# Patient Record
Sex: FEMALE | Race: WHITE
[De-identification: names, ages, dates, MRNs, and addresses within clinical notes are randomized per-mention and may not be internally consistent; named-entity substitution may affect disease eponyms.]

---

## 2018-03-04 ENCOUNTER — HOSPITAL ENCOUNTER (INPATIENT)
Dept: HOSPITAL 17 - PHED | Age: 83
LOS: 5 days | Discharge: HOME HEALTH | DRG: 541 | End: 2018-03-09
Attending: INTERNAL MEDICINE | Admitting: INTERNAL MEDICINE
Payer: MEDICARE

## 2018-03-04 VITALS
RESPIRATION RATE: 20 BRPM | TEMPERATURE: 98 F | SYSTOLIC BLOOD PRESSURE: 120 MMHG | HEART RATE: 65 BPM | DIASTOLIC BLOOD PRESSURE: 58 MMHG | OXYGEN SATURATION: 92 %

## 2018-03-04 VITALS
HEART RATE: 71 BPM | RESPIRATION RATE: 18 BRPM | OXYGEN SATURATION: 93 % | DIASTOLIC BLOOD PRESSURE: 70 MMHG | TEMPERATURE: 98.2 F | SYSTOLIC BLOOD PRESSURE: 156 MMHG

## 2018-03-04 VITALS
OXYGEN SATURATION: 97 % | SYSTOLIC BLOOD PRESSURE: 137 MMHG | DIASTOLIC BLOOD PRESSURE: 59 MMHG | RESPIRATION RATE: 16 BRPM | HEART RATE: 82 BPM

## 2018-03-04 VITALS
RESPIRATION RATE: 16 BRPM | OXYGEN SATURATION: 92 % | HEART RATE: 60 BPM | TEMPERATURE: 97.6 F | DIASTOLIC BLOOD PRESSURE: 48 MMHG | SYSTOLIC BLOOD PRESSURE: 100 MMHG

## 2018-03-04 VITALS — HEIGHT: 58 IN | BODY MASS INDEX: 30.4 KG/M2 | WEIGHT: 144.84 LBS

## 2018-03-04 DIAGNOSIS — E78.5: ICD-10-CM

## 2018-03-04 DIAGNOSIS — I10: ICD-10-CM

## 2018-03-04 DIAGNOSIS — M19.90: ICD-10-CM

## 2018-03-04 DIAGNOSIS — M70.21: ICD-10-CM

## 2018-03-04 DIAGNOSIS — Z87.01: ICD-10-CM

## 2018-03-04 DIAGNOSIS — M86.9: Primary | ICD-10-CM

## 2018-03-04 DIAGNOSIS — Z95.810: ICD-10-CM

## 2018-03-04 DIAGNOSIS — J44.9: ICD-10-CM

## 2018-03-04 DIAGNOSIS — Z79.82: ICD-10-CM

## 2018-03-04 DIAGNOSIS — I25.10: ICD-10-CM

## 2018-03-04 DIAGNOSIS — M54.31: ICD-10-CM

## 2018-03-04 DIAGNOSIS — Z95.5: ICD-10-CM

## 2018-03-04 DIAGNOSIS — Z87.891: ICD-10-CM

## 2018-03-04 DIAGNOSIS — I25.2: ICD-10-CM

## 2018-03-04 LAB
BASOPHILS # BLD AUTO: 0.3 TH/MM3 (ref 0–0.2)
BASOPHILS NFR BLD: 2.9 % (ref 0–2)
BUN SERPL-MCNC: 27 MG/DL (ref 7–18)
CALCIUM SERPL-MCNC: 9 MG/DL (ref 8.5–10.1)
CHLORIDE SERPL-SCNC: 102 MEQ/L (ref 98–107)
CREAT SERPL-MCNC: 1.2 MG/DL (ref 0.5–1)
EOSINOPHIL # BLD: 0.3 TH/MM3 (ref 0–0.4)
EOSINOPHIL NFR BLD: 2.2 % (ref 0–4)
ERYTHROCYTE [DISTWIDTH] IN BLOOD BY AUTOMATED COUNT: 14.4 % (ref 11.6–17.2)
GFR SERPLBLD BASED ON 1.73 SQ M-ARVRAT: 42 ML/MIN (ref 89–?)
GLUCOSE SERPL-MCNC: 82 MG/DL (ref 74–106)
HCO3 BLD-SCNC: 27.7 MEQ/L (ref 21–32)
HCT VFR BLD CALC: 36.5 % (ref 35–46)
HGB BLD-MCNC: 12.2 GM/DL (ref 11.6–15.3)
LYMPHOCYTES # BLD AUTO: 1.3 TH/MM3 (ref 1–4.8)
LYMPHOCYTES NFR BLD AUTO: 11.6 % (ref 9–44)
MCH RBC QN AUTO: 29.7 PG (ref 27–34)
MCHC RBC AUTO-ENTMCNC: 33.3 % (ref 32–36)
MCV RBC AUTO: 89 FL (ref 80–100)
MONOCYTE #: 1 TH/MM3 (ref 0–0.9)
MONOCYTES NFR BLD: 8.6 % (ref 0–8)
NEUTROPHILS # BLD AUTO: 8.6 TH/MM3 (ref 1.8–7.7)
NEUTROPHILS NFR BLD AUTO: 74.7 % (ref 16–70)
PLATELET # BLD: 203 TH/MM3 (ref 150–450)
PMV BLD AUTO: 7.6 FL (ref 7–11)
RBC # BLD AUTO: 4.11 MIL/MM3 (ref 4–5.3)
SODIUM SERPL-SCNC: 136 MEQ/L (ref 136–145)
WBC # BLD AUTO: 11.5 TH/MM3 (ref 4–11)

## 2018-03-04 PROCEDURE — 82565 ASSAY OF CREATININE: CPT

## 2018-03-04 PROCEDURE — 94150 VITAL CAPACITY TEST: CPT

## 2018-03-04 PROCEDURE — 96374 THER/PROPH/DIAG INJ IV PUSH: CPT

## 2018-03-04 PROCEDURE — 85025 COMPLETE CBC W/AUTO DIFF WBC: CPT

## 2018-03-04 PROCEDURE — 73200 CT UPPER EXTREMITY W/O DYE: CPT

## 2018-03-04 PROCEDURE — 87040 BLOOD CULTURE FOR BACTERIA: CPT

## 2018-03-04 PROCEDURE — 86140 C-REACTIVE PROTEIN: CPT

## 2018-03-04 PROCEDURE — 72100 X-RAY EXAM L-S SPINE 2/3 VWS: CPT

## 2018-03-04 PROCEDURE — 93971 EXTREMITY STUDY: CPT

## 2018-03-04 PROCEDURE — 80048 BASIC METABOLIC PNL TOTAL CA: CPT

## 2018-03-04 PROCEDURE — 84550 ASSAY OF BLOOD/URIC ACID: CPT

## 2018-03-04 PROCEDURE — 85652 RBC SED RATE AUTOMATED: CPT

## 2018-03-04 RX ADMIN — CARVEDILOL SCH MG: 12.5 TABLET, FILM COATED ORAL at 20:04

## 2018-03-04 RX ADMIN — Medication SCH ML: at 20:03

## 2018-03-04 RX ADMIN — STANDARDIZED SENNA CONCENTRATE AND DOCUSATE SODIUM SCH TAB: 8.6; 5 TABLET, FILM COATED ORAL at 21:16

## 2018-03-04 RX ADMIN — ENOXAPARIN SODIUM SCH MG: 30 INJECTION SUBCUTANEOUS at 20:04

## 2018-03-04 NOTE — PD
HPI


Chief Complaint:  Musculoskeletal Complaint


Time Seen by Provider:  14:02


Travel History


International Travel<30 days:  No


Contact w/Intl Traveler<30days:  No


Traveled to known affect area:  No





History of Present Illness


HPI


Patient is an 87-year-old female presents emergency department for evaluation 

of right arm pain which is been going on for the past few weeks.  Patient 

states been gradually worsening and enough was enough and she decided to come 

the emergency department to be seen.  She has been seen by her [facility] doctor who said that she had "an infection in her elbow" and she was 

had finished some antibiotics but still was not feeling any better and thought 

it might of been tendinitis, she was referred to a sports medicine doctor who 

sent her for some imaging and to "rule out osteomyelitis".  She had imaging 

done this week and does not know the results.  She states that she had some 

kind of contrast for a bone scan.  She denies any fevers denies any nausea 

vomiting denies any injury but states the swelling has progressed from just in 

her elbow now on her hand as well.  She has been taking hydrocodone at home 

with some relief but she states it makes her very sleepy.





PFSH


Past Medical History


Hx Anticoagulant Therapy:  Yes (asa 81mg)


Arthritis:  Yes (Fingers)


Asthma:  No


Autoimmune Disease:  No


Blood Disorders:  No


Anxiety:  Yes


Depression:  No


Heart Rhythm Problems:  No


Cancer:  No


Cardiac Catheterization:  Yes (MI, angioplasty, stents)


Cardiovascular Problems:  Yes (htn on meds, pacemaker, defib)


High Cholesterol:  Yes


Chemotherapy:  No


Chest Pain:  Yes


Congestive Heart Failure:  Yes


COPD:  Yes


Cerebrovascular Accident:  No


Coronary Artery Disease:  Yes


Diabetes:  No


Patient Takes Glucophage:  No


Diminished Hearing:  No


Endocrine:  No


Gastrointestinal Disorders:  Yes (HX c-diff, peritonitis)


GERD:  Yes


Genitourinary:  Yes (Frequency)


Headaches:  Yes


Hepatitis:  No


Hiatal Hernia:  Yes


Hypertension:  Yes


Immune Disorder:  No


Kidney Stones:  No


Medical other:  Yes (SOME TYPE OF CONNECTIVE TISSUE DISORDER)


Musculoskeletal:  Yes (Tendonitis (R) shoulder, DDD low back)


Neurologic:  No


Psychiatric:  Yes


Reproductive:  No


Respiratory:  Yes (copd)


Integumentary:  Yes (CELLULITIS LEFT FOOT/CONNECTIVE TISSUE DISORDER)


Immunizations Current:  Yes


Migraines:  No


Myocardial Infarction:  Yes


Pneumonia:  Yes (H/O)


Radiation Therapy:  No


Renal Failure:  No


Seizures:  Yes


Shingles:  Yes


Sleep Apnea:  No


Thyroid Disease:  No


Ulcer:  No


PNEUMOCCOCAL Vaccine (Year):  1


Pregnant?:  Not Pregnant


Menopausal:  Yes





Past Surgical History


Abdominal Surgery:  Yes ( REMOVED CYST IN ABDOMEN,APPENDECTOMY)


AICD:  Yes


Appendectomy:  Yes


Arteriovenous Shunt:  Yes


Body Medical Devices:  Cardiac stents


Cardiac Surgery:  Yes (PM and DF)


Coronary Stent:  Yes


Ear Surgery:  No


Endocrine Surgery:  No


Eye Surgery:  Yes (BL cataracts)


Genitourinary Surgery:  No


Gynecologic Surgery:  No


Insulin Pump:  No


Joint Replacement:  No


Neurologic Surgery:  No


Oral Surgery:  No


Pacemaker:  Yes


Thoracic Surgery:  No


Other Surgery:  Yes (Multiple for SBO, eye implants)





Family History


Family Hypercholesterolemia:  Yes





Social History


Alcohol Use:  Yes (Occ.)


Tobacco Use:  No (Quit)


Substance Use:  No





Allergies-Medications


(Allergen,Severity, Reaction):  


Coded Allergies:  


     Sulfa (Sulfonamide Antibiotics) (Unverified  Allergy, Severe, Rash, 3/4/18)


     cilostazol (Unverified  Allergy, Severe, Itching, 3/4/18)


     niacin (Unverified  Allergy, Severe, Rash, 3/4/18)


     sulfisoxazole (Unverified  Allergy, Severe, Rash, 3/4/18)


     *MDRO Multi-Drug Resistant Organism (Verified  Allergy, Unknown, 3/4/18)


 C-diff 2/2014


Reported Meds & Prescriptions





Reported Meds & Active Scripts


Active


Reported


Ventolin Hfa 18 GM Inh (Albuterol Sulfate) 90 Mcg/Act Aer 2 Puff INH Q6H PRN


Tramadol (Tramadol HCl) 50 Mg Tab 50 Mg PO HS PRN


Temazepam 15 Mg Cap 15 Mg PO HS PRN


[Promethazinie-Codei]     


Multiple Vitamin 1 Tab 1 Tab PO DAILY


Lovastatin 40 Mg Tab 80 Mg PO DAILY


Losartan (Losartan Potassium) 50 Mg Tab 50 Mg PO BID


[Ipratropium Bromide]     


Hydrochlorothiazide 12.5 Mg Cap 12.5 Mg PO DAILY


Carvedilol 12.5 Mg Tab 12.5 Mg PO BID


Aspirin Children's (Aspirin) 81 Mg Chew 81 Mg CHEW DAILY


Alprazolam 0.5 Mg Tab 0.5 Mg PO BID PRN


[Albuterol Nebs]     


Hydrocodone-Acetaminophen 5-325 mg Tab 1 Tab PO Q6H PRN








Review of Systems


Except as stated in HPI:  all other systems reviewed are Neg





Physical Exam


Narrative


GENERAL:Well-developed, well-nourished, appears uncomfortable almost hysterical.


SKIN: Focused skin assessment warm/dry.


HEAD: Atraumatic. Normocephalic. 


EYES: Pupils equal and round. No scleral icterus. No injection or drainage. 


ENT: No nasal bleeding or discharge.  Mucous membranes pink and moist.


NECK: Trachea midline. No JVD. 


CARDIOVASCULAR: Regular rate and rhythm.  No murmur appreciated.


RESPIRATORY: No accessory muscle use. Clear to auscultation. Breath sounds 

equal bilaterally. 


GASTROINTESTINAL: Abdomen soft, non-tender, nondistended. Hepatic and splenic 

margins not palpable. 


MUSCULOSKELETAL: No obvious deformities. No clubbing.  No cyanosis.  There is 

some moderate edema pitting over the MCP joints on the right hand, there is 

also some edema particularly on the medial aspect of her right elbow.  I do not 

appreciate a joint effusion at the elbow, she has some tenderness over the 

olecranon process.  The joint is not hot, there is no erythema, she is able to 

range the joint and flexion extension supination and pronation.


NEUROLOGICAL: Awake and alert. No obvious cranial nerve deficits.  Motor 

grossly within normal limits. Normal speech.


PSYCHIATRIC: Appropriate mood and affect; insight and judgment normal.





Data


Data


Last Documented VS





Vital Signs








  Date Time  Temp Pulse Resp B/P (MAP) Pulse Ox O2 Delivery O2 Flow Rate FiO2


 


3/4/18 14:04   16     


 


3/4/18 12:52 98.2 71  156/70 (98) 93   








Orders





 Orders


Complete Blood Count With Diff (3/4/18 14:14)


Basic Metabolic Panel (Bmp) (3/4/18 14:14)


C-Reactive Protein (Crp) (3/4/18 14:27)


Westergren Sedimentation Rate (3/4/18 14:27)


Morphine Inj (Morphine Inj) (3/4/18 14:30)


Us Arm Venous Doppler (3/4/18 14:27)


Blood Culture (3/4/18 15:55)


Vancomycin Inj (Vancomycin Inj) (3/4/18 16:00)


Admit Order (Ed Use Only) (3/4/18 )





Labs





Laboratory Tests








Test


  3/4/18


14:20 3/4/18


14:52


 


White Blood Count 11.5 TH/MM3  


 


Red Blood Count 4.11 MIL/MM3  


 


Hemoglobin 12.2 GM/DL  


 


Hematocrit 36.5 %  


 


Mean Corpuscular Volume 89.0 FL  


 


Mean Corpuscular Hemoglobin 29.7 PG  


 


Mean Corpuscular Hemoglobin


Concent 33.3 % 


  


 


 


Red Cell Distribution Width 14.4 %  


 


Platelet Count 203 TH/MM3  


 


Mean Platelet Volume 7.6 FL  


 


Neutrophils (%) (Auto) 74.7 %  


 


Lymphocytes (%) (Auto) 11.6 %  


 


Monocytes (%) (Auto) 8.6 %  


 


Eosinophils (%) (Auto) 2.2 %  


 


Basophils (%) (Auto) 2.9 %  


 


Neutrophils # (Auto) 8.6 TH/MM3  


 


Lymphocytes # (Auto) 1.3 TH/MM3  


 


Monocytes # (Auto) 1.0 TH/MM3  


 


Eosinophils # (Auto) 0.3 TH/MM3  


 


Basophils # (Auto) 0.3 TH/MM3  


 


CBC Comment DIFF FINAL  


 


Differential Comment   


 


Blood Urea Nitrogen 27 MG/DL  


 


Creatinine 1.20 MG/DL  


 


Random Glucose 82 MG/DL  


 


Calcium Level 9.0 MG/DL  


 


Sodium Level 136 MEQ/L  


 


Potassium Level 4.2 MEQ/L  


 


Chloride Level 102 MEQ/L  


 


Carbon Dioxide Level 27.7 MEQ/L  


 


Anion Gap 6 MEQ/L  


 


Estimat Glomerular Filtration


Rate 42 ML/MIN 


  


 


 


Erythrocyte Sedimentation Rate  92 mm/hr 


 


C-Reactive Protein  9.97 MG/DL 











MDM


Medical Decision Making


Medical Screen Exam Complete:  Yes


Emergency Medical Condition:  Yes


Differential Diagnosis


Osteomyelitis, infectious intra-articular arthritis unlikely, cellulitis 

unlikely, osteoarthritis


Narrative Course


Patient's nuclear medicine bone scan was pulled out from [employer], 

dated 3/2/2018.  Unfortunately her printer is not working so I am directly 

translating it here:





<direct quote>


MEDICAL/SURGICAL HISTORY:  pain in right elbow with no known trauma since 118.  

Patient was diagnosed with an infection in the right elbow and has had multiple 

rounds of antibiotics ever since.


COMPARED: No prior exams available for comparison


PRIOR BONE SCANS: No correlated bone scans available for comparison.


TECHNIQUE: Three-phase bone scanning of bilateral elbows was performed.


FINDINGS: There is increased blood pool activity involving the patient's right 

forearm and on delayed imaging there is a focal uptake appears to be 

corresponding to the bony structures at the site particularly the distal 

humerus.  Exact localization is difficult.


CONCLUSION:


Abnormal uptake partially within the bony structures of the elbow difficult to 

accurately localize suspicious for osteomyelitis and further characterization 

with MRI examination with and without contrast is recommended.


Electronically signed by: NORTH Liu MD


<end direct quote>





Patient unfortunately cannot have MRI she states she has a pacemaker which she 

does not think is MRI compatible.





Patient's ESR and CRP are elevated, white blood cell count is normal but there 

is some minimal left shift.  The patient was discussed with Dr. Rosado, we agree 

that admission is for the workup and diagnosis is warranted at this time.  

Patient blood cultures were drawn and sent and will be started on vancomycin.  

Patient does have significant history of C. difficile colitis which had to be 

treated with multiple rounds of antimicrobials in the past.





Results were discussed with the patient and she is agreeable for admission, her 

pain is well under control after morphine given.





Given her swelling and ultrasound of her right upper extremity was also 

obtained which shows no evidence of DVT:





Last 24 hours Impressions








Upper Extremity Ultrasound 3/4/18 3527 Signed





Impressions: 





 Service Date/Time:  Sunday, March 4, 2018 15:03 - CONCLUSION:  Normal 





 examination.       Octavio David MD 











Diagnosis





 Primary Impression:  


 Osteomyelitis of elbow





Admitting Information


Admitting Physician Requests:  Admit


Condition:  Stable











Jose Mata MD Mar 4, 2018 14:47

## 2018-03-04 NOTE — RADRPT
EXAM DATE/TIME:  03/04/2018 15:03 

 

[facility] COMPARISON:     

No previous studies available for comparison.

        

 

 

INDICATIONS :                

Right arm swelling. 

            

 

MEDICAL HISTORY :     

Hypertension. Myocardial infarction. Hypercholesterolemia. Dentures. Congestive heart failure. Corona
ry artery disease. Anticoagulant therapy. Chest pain. COPD. Emphysema. Hiatal hernia. Urinary tract i
nfection. Arthritis. Anxiety. Alcohol use. Shingles. Cellulitis. 

 

SURGICAL HISTORY :     

Pacemaker. Appendectomy.   Bilateral cataract surgery. AV fistula. Cardiac catheterization. Angioplas
ty. Removed cyst from abdomen. 

 

ENCOUNTER:     

Initial

 

ACUITY:     

1 week

 

PAIN SCORE:      

5/10

 

LOCATION:      

Right  arm.

                       

 

FINDINGS:     

There is spontaneous flow documented in the brachial, basilic, cephalic, axillary, and subclavian vei
ns.  The vessels are compressible and augmentation response is documented.  No filling defects are se
en.  The flow is phasic with respiration.  Direction of flow in the jugular vein is caudal.  

 

CONCLUSION:     

Normal examination.  

 

 

 

 Octavio David MD on March 04, 2018 at 15:32           

Board Certified Radiologist.

 This report was verified electronically.

## 2018-03-05 VITALS
SYSTOLIC BLOOD PRESSURE: 93 MMHG | RESPIRATION RATE: 16 BRPM | TEMPERATURE: 96.2 F | DIASTOLIC BLOOD PRESSURE: 51 MMHG | HEART RATE: 79 BPM | OXYGEN SATURATION: 92 %

## 2018-03-05 VITALS
RESPIRATION RATE: 18 BRPM | HEART RATE: 56 BPM | DIASTOLIC BLOOD PRESSURE: 60 MMHG | SYSTOLIC BLOOD PRESSURE: 131 MMHG | TEMPERATURE: 96.3 F | OXYGEN SATURATION: 93 %

## 2018-03-05 VITALS
SYSTOLIC BLOOD PRESSURE: 112 MMHG | OXYGEN SATURATION: 94 % | RESPIRATION RATE: 15 BRPM | DIASTOLIC BLOOD PRESSURE: 56 MMHG | HEART RATE: 62 BPM

## 2018-03-05 VITALS
RESPIRATION RATE: 18 BRPM | OXYGEN SATURATION: 92 % | HEART RATE: 68 BPM | DIASTOLIC BLOOD PRESSURE: 57 MMHG | TEMPERATURE: 96.5 F | SYSTOLIC BLOOD PRESSURE: 114 MMHG

## 2018-03-05 VITALS
HEART RATE: 63 BPM | DIASTOLIC BLOOD PRESSURE: 47 MMHG | SYSTOLIC BLOOD PRESSURE: 93 MMHG | TEMPERATURE: 97.6 F | OXYGEN SATURATION: 92 % | RESPIRATION RATE: 18 BRPM

## 2018-03-05 LAB
BASOPHILS # BLD AUTO: 0.1 TH/MM3 (ref 0–0.2)
BASOPHILS NFR BLD: 0.8 % (ref 0–2)
BUN SERPL-MCNC: 25 MG/DL (ref 7–18)
CALCIUM SERPL-MCNC: 8.7 MG/DL (ref 8.5–10.1)
CHLORIDE SERPL-SCNC: 105 MEQ/L (ref 98–107)
CREAT SERPL-MCNC: 1.1 MG/DL (ref 0.5–1)
EOSINOPHIL # BLD: 0.3 TH/MM3 (ref 0–0.4)
EOSINOPHIL NFR BLD: 2.5 % (ref 0–4)
ERYTHROCYTE [DISTWIDTH] IN BLOOD BY AUTOMATED COUNT: 14.4 % (ref 11.6–17.2)
GFR SERPLBLD BASED ON 1.73 SQ M-ARVRAT: 47 ML/MIN (ref 89–?)
GLUCOSE SERPL-MCNC: 77 MG/DL (ref 74–106)
HCO3 BLD-SCNC: 26 MEQ/L (ref 21–32)
HCT VFR BLD CALC: 33.8 % (ref 35–46)
HGB BLD-MCNC: 11.5 GM/DL (ref 11.6–15.3)
LYMPHOCYTES # BLD AUTO: 0.8 TH/MM3 (ref 1–4.8)
LYMPHOCYTES NFR BLD AUTO: 8.3 % (ref 9–44)
MCH RBC QN AUTO: 30.4 PG (ref 27–34)
MCHC RBC AUTO-ENTMCNC: 34 % (ref 32–36)
MCV RBC AUTO: 89.3 FL (ref 80–100)
MONOCYTE #: 0.6 TH/MM3 (ref 0–0.9)
MONOCYTES NFR BLD: 6 % (ref 0–8)
NEUTROPHILS # BLD AUTO: 8.3 TH/MM3 (ref 1.8–7.7)
NEUTROPHILS NFR BLD AUTO: 82.4 % (ref 16–70)
PLATELET # BLD: 249 TH/MM3 (ref 150–450)
PMV BLD AUTO: 7.4 FL (ref 7–11)
RBC # BLD AUTO: 3.78 MIL/MM3 (ref 4–5.3)
SODIUM SERPL-SCNC: 138 MEQ/L (ref 136–145)
WBC # BLD AUTO: 10.1 TH/MM3 (ref 4–11)

## 2018-03-05 RX ADMIN — STANDARDIZED SENNA CONCENTRATE AND DOCUSATE SODIUM SCH TAB: 8.6; 5 TABLET, FILM COATED ORAL at 08:22

## 2018-03-05 RX ADMIN — LOSARTAN POTASSIUM SCH MG: 50 TABLET, FILM COATED ORAL at 08:22

## 2018-03-05 RX ADMIN — HYDROCODONE BITARTRATE AND ACETAMINOPHEN PRN TAB: 5; 325 TABLET ORAL at 06:20

## 2018-03-05 RX ADMIN — Medication SCH ML: at 09:00

## 2018-03-05 RX ADMIN — CARVEDILOL SCH MG: 12.5 TABLET, FILM COATED ORAL at 08:22

## 2018-03-05 RX ADMIN — Medication SCH ML: at 20:15

## 2018-03-05 RX ADMIN — ASPIRIN 81 MG SCH MG: 81 TABLET ORAL at 08:22

## 2018-03-05 RX ADMIN — ENOXAPARIN SODIUM SCH MG: 30 INJECTION SUBCUTANEOUS at 20:14

## 2018-03-05 RX ADMIN — VANCOMYCIN HYDROCHLORIDE SCH MLS/HR: 1 INJECTION, SOLUTION INTRAVENOUS at 17:15

## 2018-03-05 RX ADMIN — STANDARDIZED SENNA CONCENTRATE AND DOCUSATE SODIUM SCH TAB: 8.6; 5 TABLET, FILM COATED ORAL at 20:14

## 2018-03-05 RX ADMIN — HYDROCODONE BITARTRATE AND ACETAMINOPHEN PRN TAB: 5; 325 TABLET ORAL at 20:15

## 2018-03-05 RX ADMIN — CARVEDILOL SCH MG: 12.5 TABLET, FILM COATED ORAL at 20:15

## 2018-03-05 RX ADMIN — HYDROCODONE BITARTRATE AND ACETAMINOPHEN PRN TAB: 5; 325 TABLET ORAL at 00:41

## 2018-03-05 RX ADMIN — HYDROCHLOROTHIAZIDE SCH MG: 12.5 CAPSULE ORAL at 08:21

## 2018-03-05 NOTE — PD.ID.CON
History of Present Illness


Service


ID


Consult Requested By


Dr Richy Rosado


Reason for Consult


osteo L elbow


Primary Care Physician


Sedrick Das MD


Diagnoses:  


History of Present Illness


86 yo female with multiple medical problems 


she developped R elbow pain since January


Has a brief course of Keflex without improvement


She presented with edematous tender R elbow





H/o refractory C.diff that required almost 6 mos of treatment





Review of Systems


Except as stated in HPI:  all other systems reviewed are Neg





Past Family Social History


Allergies:  


Coded Allergies:  


     Sulfa (Sulfonamide Antibiotics) (Unverified  Allergy, Severe, Rash, 3/4/18)


     cilostazol (Unverified  Allergy, Severe, Itching, 3/4/18)


     niacin (Unverified  Allergy, Severe, Rash, 3/4/18)


     sulfisoxazole (Unverified  Allergy, Severe, Rash, 3/4/18)


Past Medical History


  coronary artery disease with stents


 status post pacemaker and ICD.  


 COPD.


 CHF, hypertension, hyperlipidemia.  


 C. difficile colitis that lasted for several months


Past Surgical History


   cataracts surgery


 appendectomy. 


kyphoplasty for compression fracture.


multiple surgeries for small bowel obstruction.


Active Ordered Medications


Medications where reviewed in EMR


Antibiotics Include:  none 


Vancomycin was given while n the hospital


Family History


reviewed


nbon contributory to current ID issue


Social History


  alcohol  occasionally. former  smoker many years, quite


 independent in her activities of daily living.





Physical Exam


Vital Signs





Vital Signs








  Date Time  Temp Pulse Resp B/P (MAP) Pulse Ox O2 Delivery O2 Flow Rate FiO2


 


3/5/18 16:00 96.5 68 18 114/57 (76) 92   


 


3/5/18 12:00  62 15 112/56 (74) 94   


 


3/5/18 08:00 96.3 56 18 131/60 (83) 93   


 


3/5/18 07:20   20     


 


3/5/18 00:00 97.6 63 18 93/47 (62) 92   








Physical Exam


CONSTITUTIONAL/GENERAL: This is an obese elderly  nourished patient, in no 

apparent distress.


TUBES/LINES/DRAINS:


SKIN: No jaundice, rashes, or lesions.   Skin temperature appropriate. Not 

diaphoretic. 


HEAD: Atraumatic. Normocephalic.


EYES: Pupils equal and round and reactive. Extraocular motions intact. No 

scleral icterus. No injection or drainage. Fundi not examined.


ENT: Hearing grossly normal. Nose without bleeding or purulent drainage. Throat 

without visible erythema, exudates, masses, or lesions.


NECK: Trachea midline. Supple, nontender. No palpable thyroid enlargement or 

nodularity. 


CARDIOVASCULAR: Regular rate and rhythm without murmurs, gallops, or rubs. No 

JVD. Peripheral pulses symmetric.


RESPIRATORY/CHEST: Symmetric, unlabored respirations. Clear to auscultation. 

Breath sounds equal bilaterally. No wheezes, rales, or rhonchi.  


GASTROINTESTINAL: Abdomen soft, non-tender, nondistended. No hepato-splenomegaly

, or palpable masses. No guarding. Bowel sounds present.


GENITOURINARY: Without palpable bladder distension.  


MUSCULOSKELETAL: Extremities without clubbing, cyanosis, or edema. No joint 

tenderness or effusion noted. No calf tenderness. No mottling or clubbing.


STATUS LOCALIS: ill defined edema, boggyness of R elbow. Unable to fully extend 

2/2 pain


Tender to palpation


No erythema


LYMPHATICS: No palpable cervical axillae or supraclavicular adenopathy.


NEUROLOGICAL: Awake and alert. Motor and sensory grossly within normal limits. 

Follows commands. Clear speech Moves all extremities.


PSYCHIATRIC: No obvious anxiety/depression. no apparent hallucinations or other 

psychotic thought process.


Laboratory





Laboratory Tests








Test


  3/4/18


22:25 3/5/18


05:30 3/5/18


09:00


 


Uric Acid 8.3   


 


Blood Urea Nitrogen  25  


 


Creatinine  1.10  


 


Random Glucose  77  


 


Calcium Level  8.7  


 


Sodium Level  138  


 


Potassium Level  3.8  


 


Chloride Level  105  


 


Carbon Dioxide Level  26.0  


 


Estimat Glomerular Filtration


Rate 


  47 


  


 


 


White Blood Count   10.1 


 


Red Blood Count   3.78 


 


Hemoglobin   11.5 


 


Hematocrit   33.8 


 


Mean Corpuscular Volume   89.3 


 


Mean Corpuscular Hemoglobin   30.4 


 


Mean Corpuscular Hemoglobin


Concent 


  


  34.0 


 


 


Red Cell Distribution Width   14.4 


 


Platelet Count   249 


 


Mean Platelet Volume   7.4 


 


Neutrophils (%) (Auto)   82.4 


 


Lymphocytes (%) (Auto)   8.3 


 


Monocytes (%) (Auto)   6.0 


 


Eosinophils (%) (Auto)   2.5 


 


Basophils (%) (Auto)   0.8 


 


Neutrophils # (Auto)   8.3 


 


Lymphocytes # (Auto)   0.8 


 


Monocytes # (Auto)   0.6 


 


Eosinophils # (Auto)   0.3 


 


Basophils # (Auto)   0.1 


 


CBC Comment   DIFF FINAL 


 


Differential Comment    














 Date/Time


Source Procedure


Growth Status


 


 


 3/4/18 14:38


Blood Peripheral Aerobic Blood Culture - Preliminary


NO GROWTH IN 1 DAY Resulted


 


 3/4/18 14:38


Blood Peripheral Anaerobic Blood Culture - Preliminary


NO GROWTH IN 1 DAY Resulted








Result Diagram:  


3/5/18 0900                                                                    

            3/5/18 0530





Imaging





Last Impressions








Upper Extremity Ultrasound 3/4/18 1427 Signed





Impressions: 





 Service Date/Time:  Sunday, March 4, 2018 15:03 - CONCLUSION:  Normal 





 examination.       Octavio David MD 


 


Upper Extremity CT 3/4/18 0000 Signed





Impressions: 





 Service Date/Time:  Monday, March 5, 2018 01:01 - CONCLUSION:  1. There is an 





 abnormal fluid collection along with soft tissue swelling and subcutaneous 

edema 





 on the posterior aspect of the elbow. 2. No osseous changes are identified to 





 definitively indicate osteomyelitis. However, the prior bone scan performed at 





 [facility] on 3/2/2018 demonstrated increased blood flow and 





 possible increased uptake in the distal humerus suspicious for osteomyelitis. 





 Therefore, consider correlating with either MRI with and without intravenous 





 contrast or tagged white blood cell study to better evaluate. 3. Elbow joint 





 effusion.     Carter Dawn MD 











Assessment and Plan


Assessment and Plan


L elbow fluid collection, probably bursitisa


Bone scan with increased distal hreru uptake possibly osteomyelitis





   Aspirate fluid collection from R elbow for culture


   after aspirated will start on vancomycin with further abx adjustement  per 

culture report


Discussed Condition With


Dr Richy Barahona


family @ b/s











Kiki Dominguez MD Mar 5, 2018 20:27

## 2018-03-05 NOTE — RADRPT
EXAM DATE/TIME:  03/05/2018 01:01 

 

[facility] COMPARISON:     

No previous studies available for comparison.

 

 

INDICATIONS :     

***Evaluate for osteomyelitis. Right elbow pain.

                      

 

RADIATION DOSE:     

14.80 CTDIvol (mGy) 

 

 

MEDICAL HISTORY :     

None   

 

SURGICAL HISTORY :      

None.   

 

ENCOUNTER:      

Initial

 

ACUITY:      

4 - 6 days

 

PAIN SCALE:      

7/10

 

LOCATION:       

Right  elbow

 

TECHNIQUE:     

Volumetric scanning of the elbow was performed.  Using automated exposure control and adjustment of t
he mA and/or kV according to patient size, radiation dose was kept as low as reasonably achievable to
 obtain optimal diagnostic quality images.  DICOM format image data is available electronically for r
eview and comparison.  

 

FINDINGS:     

The proximal radius and ulna as well as the distal humerus demonstrate normal density without erosion
 or periosteal reaction. There is a elbow joint effusion and there is soft tissue swelling along the 
posterior aspect of the elbow with likely a fluid collection present measuring approximately 2.9 cm. 
There is subcutaneous edema posteriorly. No radiopaque foreign body is identified. Musculature demons
trates no acute finding.

 

CONCLUSION:     

1. There is an abnormal fluid collection along with soft tissue swelling and subcutaneous edema on th
e posterior aspect of the elbow.

2. No osseous changes are identified to definitively indicate osteomyelitis. However, the prior bone 
scan performed at [facility] on 3/2/2018 demonstrated increased blood flow and possib
le increased uptake in the distal humerus suspicious for osteomyelitis. Therefore, consider correlati
ng with either MRI with and without intravenous contrast or tagged white blood cell study to better e
valuate.

3. Elbow joint effusion.

 

 

 

 Carter Dawn MD on March 05, 2018 at 1:40           

Board Certified Radiologist.

 This report was verified electronically.

## 2018-03-05 NOTE — MH
cc:

Jennifer Hills MD

****

 

 

DATE OF ADMISSION:

03/04/2018

 

ADMITTING DIAGNOSIS:

Osteomyelitis of the right elbow.

 

HISTORY OF PRESENT ILLNESS:

Ms. Giron is a patient of Dr. Perez who presents to the emergency 

room with worsening right elbow pain.  According to the patient, she 

woke on Tuesday with pain in her right elbow and on Wednesday was seen

by Dr. Fairchild at [facility].  There, she was evaluated.  She 

did tell him at that time that she had been treated for cellulitis in 

that elbow earlier, she says several weeks ago.  She thinks it might 

have been in January.  She said she got 5 days of Keflex, as well as 

prednisone.  After going into into [facility] for swelling, 

interestingly, what took her to [facility] for swelling was 

not her elbow.  She went to [facility] for sciatica.  She 

tells me that the doctor there noticed the redness in the elbow and 

treated her with antibiotics.  She only got a short course.  She was 

treated for cellulitis.  She only got a short course of antibiotics 

because she has a history of long-lasting C. difficile.  Be that as it

may, Dr. Fairchild evaluated her at that time and was concerned that she 

might have an osteomyelitis and ordered a bone scan, which she had 

done on Friday.  She states she had not heard the results of the bone 

scan yet, but this morning, when she woke up, her elbow was much more 

painful and she also had swelling of her right hand, which prompted 

her to come to the emergency room.  She denies any fevers or chills.  

She denies any trauma or fall.  Her only other main complaint is the 

right sciatica, which has started several weeks ago as well.  Her 

family who is in the room with her do note that the skin is very thin 

and they have noticed that her elbows get very rough and crusty and 

that she will sometimes will get little cuts in them and they point 

one out on the left elbow.  Aside from the pain in the elbow and the 

sciatic pain, she has no other complaints.

 

PAST MEDICAL HISTORY:

Significant for coronary artery disease with stents, status post 

pacemaker and ICD.  She has history of COPD.  She does not really use 

her inhalers.  Chart says she has a history of CHF, hypertension, 

hyperlipidemia.  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 

polyarthropathy.  She also had a rather significant bout of C. 

difficile colitis that lasted for several months, which is why we try 

to avoid antibiotics on her.

 

PAST SURGICAL HISTORY:

Includes cataracts, appendectomy.  She did have kyphoplasty for 

compression fracture.  She states that she has had multiple surgeries 

for small bowel obstruction.

 

ALLERGIES:

SULFA, CILOSTAZOL, NIACIN AND SULFASALAZOLE.

 

MEDICATIONS THAT SHE USES AT HOME ARE:

Albuterol and ipratropium nebulizer, when she gets short of breath, 

baby aspirin, Coreg 12.5 twice a day, hydrochlorothiazide 25 

milligrams half a tablet daily, Alissa-tab 5/325 that was just stated on 

Wednesday, losartan 50 milligrams daily, lovastatin 40 milligrams, 

temazepam 15 milligrams at bedtime and alprazolam 0.5 twice a day as 

needed for anxiety.

 

SOCIAL HISTORY:

She states that she consumes alcohol only occasionally.  She rarely 

drinks beer, maybe occasional wine.  She was a prior smoker many years

ago, but no longer smokes.  She lives with her niece.  Her daughter is

in the room with her  as I am interviewing her.  She is 

independent in her activities of daily living.

 

REVIEW OF SYSTEMS:

She denies any chest pain, palpitations or shortness of breath.  She 

does not walk as much because of her back.  She denies any abdominal 

pain recently or any change in her bowel movements.  She makes sure 

she is regular and has a bowel movement every morning.  She denies any

swelling in her leg.  No edema.  She does say that she has got pain in

her right buttock into her right leg for the last 4-6 weeks.  She has 

actually been seeing the chiropractor for this and has been told that 

she has sciatica.  This is actually her main complaint.

 

PHYSICAL EXAMINATION:

VITAL SIGNS:  Temperature 98, pulse 65 respiration is 20, blood 

pressure is 120/58, pulse oximetry is 92% on room air.

GENERAL:  She is alert and oriented.  She is very talkative, very 

engaging.

HEENT:  Normocephalic, atraumatic.  EOM is intact.  She wears glasses.

 She has dentures on.  She has a clear oropharynx.

NECK:  Supple.

PULMONARY:  Her lungs are clear.

CARDIOVASCULAR:  Her heart is regular.  She has no ectopy.

ABDOMEN:  Globose.  She has good bowel sounds in all 4 quadrants.

EXTREMITIES:  She moves all extremities well.  She has no edema.

SKIN:  Her skin is very thin.  On her left elbow, she does have a dry 

patch of skin and evidence of an old small area where she might have 

had some bleeding at some point with some eschar.  Her right arm has 

decreased range of motion secondary to pain.  She does have erythema 

and warmth over the right elbow.   She also does have some edema into 

her right hand.  I can see no obvious point of injury or infection.

 

LABORATORY DATA:

Lab work that was done showed a white count of 11.5, hemoglobin of 

12.2, hematocrit of 36.5, platelet count of 203,000, neutrophils were 

74..7, monocytes were 8.6, basophils were 2.9,  neutrophhils 8.6.  Her

SED rate was 92 .  Sodium was 136, potassium 4.2, BUN 27 with a 

creatinine of 1.2, GFR was 42.  C-reactive protein was 9.97.

 

IMAGING:

Upper extremity ultrasound was done.  This showed this was normal.  

Apparently the bone scan was read as having some edema around the 

elbow.  An MRI was recommended.  I do not have access to the bone scan

presently.

 

ASSESSMENT AND PLAN:

This is an 87-year-old female presenting to the emergency room with 

increasing redness and swelling in her right elbow, with elevated SED 

rate and C-reactive protein, with possible osteomyelitis.  At this 

point, I have gone ahead and ordered a CT scan of the elbow since we 

cannot get an MRI secondary to her pacemaker.  I have started her on 

vancomycin.  I have actually asked ID to see her for their 

recommendations.  The big concern here is the history of C. difficile,

not a recurrence of her C. difficile as well as we try to treat her 

should she have an infection in the elbow.  We will continue with pain

medications both for the elbow and the sciatica at this point.  I 

would like to try an antiinflammatory, however, with her history of 

coronary artery disease and her diminished GFR, now with the 

vancomycin, I do not think that would be prudent, neither would 

prednisone if she does have an infection on that elbow.  So, for now, 

we will just try pain medicine for the sciatica.  In terms of the rest

of her medical problems, we will continue with her home medications, 

continue supportive care.  Further recommendations as the case 

develops.

 

 

 

__________________________________

MD MATEUSZ Andujar

D: 03/04/2018, 08:31 PM

T: 03/04/2018, 09:45 PM

Visit #: [id number]

Job #: [id number]

## 2018-03-05 NOTE — PD.ORT.PN
Subjective


Subjective Remarks


Patient workup last Tuesday with pain and swelling right elbow. She went and 

saw her primary care physician who prescribed Keflex for 5 days and prednisone. 

She continued to have issues and had a bone scan obtained 2 days ago. Since her 

admission yesterday she has been on IV antibiotics and states that she has had 

significant improvement with swelling and with pain. She denies any injury or 

puncture wounds. Orthopedics is consulted for possible osteomyelitis versus 

abscess to the right elbow.





Objective


Vitals





Vital Signs








  Date Time  Temp Pulse Resp B/P (MAP) Pulse Ox O2 Delivery O2 Flow Rate FiO2


 


3/5/18 16:00 96.5 68 18 114/57 (76) 92   


 


3/5/18 12:00  62 15 112/56 (74) 94   


 


3/5/18 08:00 96.3 56 18 131/60 (83) 93   


 


3/5/18 07:20   20     


 


3/5/18 00:00 97.6 63 18 93/47 (62) 92   


 


3/4/18 20:00 97.6 60 16 100/48 (65) 92   














I/O      


 


 3/4/18 3/4/18 3/4/18 3/5/18 3/5/18 3/5/18





 07:00 15:00 23:00 07:00 15:00 23:00


 


Intake Total     120 ml 250 ml


 


Output Total   200 ml 400 ml  


 


Balance   -200 ml -400 ml 120 ml 250 ml


 


      


 


Intake Oral     120 ml 


 


IV Total      250 ml


 


Output Urine Total   200 ml 400 ml  


 


# Voids    1 2 2


 


# Bowel Movements   0  2 2








Result Diagram:  


3/5/18 0900                                                                    

            3/5/18 0530





Imaging





Last 72 hours Impressions








Upper Extremity Ultrasound 3/4/18 1427 Signed





Impressions: 





 Service Date/Time:  Sunday, March 4, 2018 15:03 - CONCLUSION:  Normal 





 examination.       Octavio David MD 


 


Upper Extremity CT 3/4/18 0000 Signed





Impressions: 





 Service Date/Time:  Monday, March 5, 2018 01:01 - CONCLUSION:  1. There is an 





 abnormal fluid collection along with soft tissue swelling and subcutaneous 

edema 





 on the posterior aspect of the elbow. 2. No osseous changes are identified to 





 definitively indicate osteomyelitis. However, the prior bone scan performed at 





 [facility] on 3/2/2018 demonstrated increased blood flow and 





 possible increased uptake in the distal humerus suspicious for osteomyelitis. 





 Therefore, consider correlating with either MRI with and without intravenous 





 contrast or tagged white blood cell study to better evaluate. 3. Elbow joint 





 effusion.     Carter Dawn MD 








Objective Remarks


Left upper extremity: Full range of motion neurovascularly intact


Bilateral lower extremities intact sensation distally with good capillary 

refills. Strong dorsiflexion plantar flexion bilateral feet. She does have 

sciatic symptoms around down the right lower extremity.


Right upper extremity: She has no pain with range of motion of the shoulder. 

Distally she has intact sensation over the radial ulnar and median nerve 

distributions with good capillary refills. She's able fully extend her fingers 

and make a fist. Examination of the elbow does show mild fluid collection over 

the olecranon bursa. There is no erythema. No drainage is noted. Elbow range of 

motion is from 20 to 100. She has no pain with pronation or supination of the 

forearm. She has pain with range of motion of the elbow until it hits the 

extremes.





Assessment & Plan


Assessment and Plan


Olecranon bursitis, abscess versus osteomyelitis of right elbow


Continue IV antibiotics


Evaluate progress tomorrow. It appears to be superficial to the joint of the 

elbow. It does not appear to be septic arthritis.


Occupational therapy for gentle range of motion of the elbow











Igor Harvey Jr. Mar 5, 2018 19:29

## 2018-03-05 NOTE — HHI.PR
Addendum to Inpatient Note


Additional Information


seen today around 7 pm


full note to follow





dw Dr Richy Rosado


Needsw sampling of elbow effusion











Kiki Dominguez MD Mar 5, 2018 20:25

## 2018-03-05 NOTE — HHI.PR
Subjective


Remarks


No elbow pain, her sciatica bothers her still





Objective


Vitals





Vital Signs








  Date Time  Temp Pulse Resp B/P (MAP) Pulse Ox O2 Delivery O2 Flow Rate FiO2


 


3/5/18 08:00 96.3 56 18 131/60 (83) 93   


 


3/5/18 07:20   20     


 


3/5/18 00:00 97.6 63 18 93/47 (62) 92   


 


3/4/18 20:00 97.6 60 16 100/48 (65) 92   


 


3/4/18 17:04        


 


3/4/18 17:00 98.0 65 20 120/58 (78) 92   


 


3/4/18 16:19  82 16 137/59 (85) 97 Room Air  


 


3/4/18 14:04   16     


 


3/4/18 12:52 98.2 71 18 156/70 (98) 93   








Result Diagram:  


3/5/18 0900                                                                    

            3/5/18 0530





Imaging





Last Impressions








Upper Extremity Ultrasound 3/4/18 1427 Signed





Impressions: 





 Service Date/Time:  Sunday, March 4, 2018 15:03 - CONCLUSION:  Normal 





 examination.       Octavio David MD 


 


Upper Extremity CT 3/4/18 0000 Signed





Impressions: 





 Service Date/Time:  Monday, March 5, 2018 01:01 - CONCLUSION:  1. There is an 





 abnormal fluid collection along with soft tissue swelling and subcutaneous 

edema 





 on the posterior aspect of the elbow. 2. No osseous changes are identified to 





 definitively indicate osteomyelitis. However, the prior bone scan performed at 





 [facility] on 3/2/2018 demonstrated increased blood flow and 





 possible increased uptake in the distal humerus suspicious for osteomyelitis. 





 Therefore, consider correlating with either MRI with and without intravenous 





 contrast or tagged white blood cell study to better evaluate. 3. Elbow joint 





 effusion.     Carter Dawn MD 








Objective Remarks


Lying in bed sleepy but arousable


lungs cta


heart rrr


abodmend good bowel sounds


rt elbow still warm but less swollen decreased edema in rt hand, skin rough





A/P


Problem List:  


(1) Osteomyelitis of elbow


ICD Codes:  M86.9 - Osteomyelitis, unspecified


Status:  Acute


Plan:  on vancomycin


unable to get mri due to AICD


ct scan shows edema, recommends mri or wbc scan


sed rate and crp elevated


cultures pending


await ID consult


prior hx of protracted C, Difficile





(2) CAD (coronary artery disease)


ICD Codes:  I25.10 - Atherosclerotic heart disease of native coronary artery 

without angina pectoris


Status:  Chronic


Plan:  cont home medications





(3) Back pain


ICD Codes:  M54.9 - Dorsalgia, unspecified


Status:  Acute


Plan:  her main complaint


a little sleepy/sedated today


will back off on lortab








Problem Qualifiers





(1) Back pain:  








Jennifer Hills MD Mar 5, 2018 11:43

## 2018-03-06 VITALS
DIASTOLIC BLOOD PRESSURE: 60 MMHG | OXYGEN SATURATION: 92 % | HEART RATE: 61 BPM | SYSTOLIC BLOOD PRESSURE: 100 MMHG | RESPIRATION RATE: 18 BRPM | TEMPERATURE: 95.9 F

## 2018-03-06 VITALS
TEMPERATURE: 96.2 F | OXYGEN SATURATION: 92 % | DIASTOLIC BLOOD PRESSURE: 58 MMHG | HEART RATE: 54 BPM | RESPIRATION RATE: 18 BRPM | SYSTOLIC BLOOD PRESSURE: 96 MMHG

## 2018-03-06 VITALS
OXYGEN SATURATION: 93 % | DIASTOLIC BLOOD PRESSURE: 49 MMHG | RESPIRATION RATE: 18 BRPM | HEART RATE: 64 BPM | SYSTOLIC BLOOD PRESSURE: 97 MMHG | TEMPERATURE: 96.9 F

## 2018-03-06 VITALS
TEMPERATURE: 96.3 F | OXYGEN SATURATION: 94 % | DIASTOLIC BLOOD PRESSURE: 51 MMHG | HEART RATE: 69 BPM | SYSTOLIC BLOOD PRESSURE: 104 MMHG | RESPIRATION RATE: 20 BRPM

## 2018-03-06 VITALS
HEART RATE: 61 BPM | DIASTOLIC BLOOD PRESSURE: 63 MMHG | OXYGEN SATURATION: 93 % | RESPIRATION RATE: 18 BRPM | SYSTOLIC BLOOD PRESSURE: 119 MMHG | TEMPERATURE: 95.4 F

## 2018-03-06 LAB
CREAT SERPL-MCNC: 1.1 MG/DL (ref 0.5–1)
GFR SERPLBLD BASED ON 1.73 SQ M-ARVRAT: 47 ML/MIN (ref 89–?)

## 2018-03-06 RX ADMIN — CARVEDILOL SCH MG: 12.5 TABLET, FILM COATED ORAL at 19:43

## 2018-03-06 RX ADMIN — ASPIRIN 81 MG SCH MG: 81 TABLET ORAL at 10:35

## 2018-03-06 RX ADMIN — LOSARTAN POTASSIUM SCH MG: 50 TABLET, FILM COATED ORAL at 10:35

## 2018-03-06 RX ADMIN — STANDARDIZED SENNA CONCENTRATE AND DOCUSATE SODIUM SCH TAB: 8.6; 5 TABLET, FILM COATED ORAL at 10:35

## 2018-03-06 RX ADMIN — METHOCARBAMOL SCH MG: 500 TABLET ORAL at 22:16

## 2018-03-06 RX ADMIN — PRAVASTATIN SODIUM SCH MG: 40 TABLET ORAL at 19:43

## 2018-03-06 RX ADMIN — HYDROCHLOROTHIAZIDE SCH MG: 12.5 CAPSULE ORAL at 10:36

## 2018-03-06 RX ADMIN — HYDROCODONE BITARTRATE AND ACETAMINOPHEN PRN TAB: 5; 325 TABLET ORAL at 19:43

## 2018-03-06 RX ADMIN — STANDARDIZED SENNA CONCENTRATE AND DOCUSATE SODIUM SCH TAB: 8.6; 5 TABLET, FILM COATED ORAL at 19:43

## 2018-03-06 RX ADMIN — Medication SCH ML: at 19:44

## 2018-03-06 RX ADMIN — CARVEDILOL SCH MG: 12.5 TABLET, FILM COATED ORAL at 10:36

## 2018-03-06 RX ADMIN — METHOCARBAMOL SCH MG: 500 TABLET ORAL at 15:07

## 2018-03-06 RX ADMIN — Medication SCH TAB: at 22:16

## 2018-03-06 RX ADMIN — Medication SCH ML: at 10:35

## 2018-03-06 RX ADMIN — HYDROCODONE BITARTRATE AND ACETAMINOPHEN PRN TAB: 5; 325 TABLET ORAL at 06:25

## 2018-03-06 RX ADMIN — ENOXAPARIN SODIUM SCH MG: 30 INJECTION SUBCUTANEOUS at 19:43

## 2018-03-06 NOTE — HHI.PR
Subjective


Remarks


elbow feels much better, back is better but still main complaint, having bms, 

no diarrhea





Objective


Vitals





Vital Signs








  Date Time  Temp Pulse Resp B/P (MAP) Pulse Ox O2 Delivery O2 Flow Rate FiO2


 


3/6/18 08:00 95.9 61 18 100/60 (73) 92   


 


3/6/18 07:33   18     


 


3/6/18 00:00 96.3 69 20 104/51 (68) 94   


 


3/5/18 20:00 96.2 79 16 93/51 (65) 92   


 


3/5/18 16:00 96.5 68 18 114/57 (76) 92   


 


3/5/18 12:00  62 15 112/56 (74) 94   














 3/6/18 3/6/18 3/7/18





 15:00 23:00 07:00


 


   


 


# Voids 1  


 


# Bowel Movements 1  








Result Diagram:  


3/5/18 0900                                                                    

            3/6/18 0550





Imaging





Last Impressions








Upper Extremity Ultrasound 3/4/18 1427 Signed





Impressions: 





 Service Date/Time:  Sunday, March 4, 2018 15:03 - CONCLUSION:  Normal 





 examination.       Octavio David MD 


 


Upper Extremity CT 3/4/18 0000 Signed





Impressions: 





 Service Date/Time:  Monday, March 5, 2018 01:01 - CONCLUSION:  1. There is an 





 abnormal fluid collection along with soft tissue swelling and subcutaneous 

edema 





 on the posterior aspect of the elbow. 2. No osseous changes are identified to 





 definitively indicate osteomyelitis. However, the prior bone scan performed at 





 [facility] on 3/2/2018 demonstrated increased blood flow and 





 possible increased uptake in the distal humerus suspicious for osteomyelitis. 





 Therefore, consider correlating with either MRI with and without intravenous 





 contrast or tagged white blood cell study to better evaluate. 3. Elbow joint 





 effusion.     Carter Dawn MD 








Objective Remarks


alert and conversant


heart rrr


abodmed good bowel sounds


rt elbow still warm but decreased edema less tender


tender sciatic notch no pain int/ext movement of hips





A/P


Problem List:  


(1) Osteomyelitis of elbow


ICD Codes:  M86.9 - Osteomyelitis, unspecified


Status:  Acute


Plan:  on vancomycin


unable to get mri due to AICD


clinically improved, per ortho cont to monitor on antibiotics


ID following ? aspiration of fluid





(2) CAD (coronary artery disease)


ICD Codes:  I25.10 - Atherosclerotic heart disease of native coronary artery 

without angina pectoris


Status:  Chronic


Plan:  cont home medications





(3) Back pain


ICD Codes:  M54.9 - Dorsalgia, unspecified


Status:  Acute


Plan:  her main complaint


better today , will check xray lumbar spine


showed her some stretch


cautious trial of methocarbamol


PT ordered








Problem Qualifiers





(1) Back pain:  








Jennifer Hills MD Mar 6, 2018 11:41

## 2018-03-06 NOTE — OTSOAPIP
TIME SESSION COMPLETED:  145 PM



TREATMENT TIME:      0 MINS.

CHART REVIEWED.  



RE: LUIS THOMPSON PT TELEPHONED THERAPIST TO NOTIFY THAT FAUSTO ECHOLS ADDED RECOMMENDATIONS 
FOR TREATMENT TO THE ABOVE PATIENT. RECOMMENDATIONS FOR OT WERE WRITTEN IN TREATMENT NOTE, 
" OT FOR GENTLE RANGE OF MOTION TO ELBOW. " UNFORTUNATELY, OT ORDERS WERE NOT WRITTEN BUT 
ANDRE COMPLETED DURING PHYSICAL THERAPY TREATMENT AND WILL CONTINUE. HE NOTIFIED RN SHOULD 
FAUSTO BAUTISTA REASSESS PATIENT SINCE OT ORDERS WERE NOT ADDED TO ELECTRONIC MEDICAL RECORD. 




Therapist: Karen Enamorado OTR/L

                          Signature on file

## 2018-03-06 NOTE — RADRPT
EXAM DATE/TIME:  03/06/2018 14:24 

 

[facility] COMPARISON:     

BONE SCAN (WHOLE BODY) W SPECT, April 12, 2016, 11:41.  CHEST PA & LAT, April 07, 2016, 22:24.  VERTE
BROPLASTY, FLUORO GUIDED, April 15, 2016, 10:03.

 

                     

INDICATIONS :     

Lower back pain; No known injury. 

                     

 

MEDICAL HISTORY :     

None.          

 

SURGICAL HISTORY :     

None.   

 

ENCOUNTER:     

Initial                                        

 

ACUITY:     

2 months      

 

PAIN SCORE:     

10/10

 

LOCATION:     

Bilateral  lumbar spine. 

 

FINDINGS:     

Two view examination was performed.  There are five non-rib bearing vertebral bodies. Mild inferior e
ndplate compression deformity of L1 vertebral body similar to prior examination likely reflecting a p
rominent Schmorl's node. Remaining vertebral body heights are intact and stable. There is mild rotary
 dextroscoliosis of the lumbar spine centered at L3-4. Prominent multilevel degenerative spondylosis 
with disc space narrowing and osteophyte formation most prominently at L3-4. The disc spaces are main
tained.  The pedicles are intact. Osseous structures are diffusely osteopenic.

 

CONCLUSION:     

1. 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 
spondylosis most prominently at L3-4.

2. No definitive acute vertebral compression deformity. 

 

 

 Jw Hightower MD on March 06, 2018 at 14:39           

Board Certified Radiologist.

 This report was verified electronically.

## 2018-03-06 NOTE — HHI.IDPN
Subjective


Subjective


Remarks


ID FU 





Pt sitting up at bedside, family here. 


She is having more sciatic pain 


her right elbow is better  but she has more mobility in the elbow. 


No fever


Allergies:  


Coded Allergies:  


     Sulfa (Sulfonamide Antibiotics) (Unverified  Allergy, Severe, Rash, 3/4/18)


     cilostazol (Unverified  Allergy, Severe, Itching, 3/4/18)


     niacin (Unverified  Allergy, Severe, Rash, 3/4/18)


     sulfisoxazole (Unverified  Allergy, Severe, Rash, 3/4/18)





Review of Systems


Hematologic/Lymphatic


Heme/Lymph Remarks


no fever or chills


no NVD


NO sob


NO cough 


+ right leg / hip pain - chronic for 2months





Objective


.





Vital Signs








  Date Time  Temp Pulse Resp B/P (MAP) Pulse Ox O2 Delivery O2 Flow Rate FiO2


 


3/6/18 16:00 96.9 64 18 97/49 (65) 93   


 


3/6/18 12:00 96.2 54 18 96/58 (71) 92   


 


3/6/18 08:00 95.9 61 18 100/60 (73) 92   


 


3/6/18 07:33   18     


 


3/6/18 00:00 96.3 69 20 104/51 (68) 94   


 


3/5/18 20:00 96.2 79 16 93/51 (65) 92   














 3/6/18 3/6/18 3/7/18





 15:00 23:00 07:00


 


Intake Total  480 ml 


 


Balance  480 ml 


 


   


 


Intake Oral  480 ml 


 


# Voids 1 7 


 


# Bowel Movements 1  








.





Laboratory Tests








Test


  3/5/18


09:00


 


White Blood Count 10.1 TH/MM3 


 


Red Blood Count 3.78 MIL/MM3 


 


Hemoglobin 11.5 GM/DL 


 


Hematocrit 33.8 % 


 


Mean Corpuscular Volume 89.3 FL 


 


Mean Corpuscular Hemoglobin 30.4 PG 


 


Mean Corpuscular Hemoglobin


Concent 34.0 % 


 


 


Red Cell Distribution Width 14.4 % 


 


Platelet Count 249 TH/MM3 


 


Mean Platelet Volume 7.4 FL 


 


Neutrophils (%) (Auto) 82.4 % 


 


Lymphocytes (%) (Auto) 8.3 % 


 


Monocytes (%) (Auto) 6.0 % 


 


Eosinophils (%) (Auto) 2.5 % 


 


Basophils (%) (Auto) 0.8 % 


 


Neutrophils # (Auto) 8.3 TH/MM3 


 


Lymphocytes # (Auto) 0.8 TH/MM3 


 


Monocytes # (Auto) 0.6 TH/MM3 


 


Eosinophils # (Auto) 0.3 TH/MM3 


 


Basophils # (Auto) 0.1 TH/MM3 


 


CBC Comment DIFF FINAL 


 


Differential Comment  








Laboratory Tests








Test


  3/4/18


22:25 3/5/18


05:30 3/6/18


05:50


 


Uric Acid 8.3 MG/DL   


 


Blood Urea Nitrogen  25 MG/DL  


 


Creatinine  1.10 MG/DL  1.10 MG/DL 


 


Random Glucose  77 MG/DL  


 


Calcium Level  8.7 MG/DL  


 


Sodium Level  138 MEQ/L  


 


Potassium Level  3.8 MEQ/L  


 


Chloride Level  105 MEQ/L  


 


Carbon Dioxide Level  26.0 MEQ/L  


 


Estimat Glomerular Filtration


Rate 


  47 ML/MIN 


  47 ML/MIN 


 








Microbiology








 Date/Time


Source Procedure


Growth Status


 


 


 3/4/18 14:38


Blood Peripheral Aerobic Blood Culture - Preliminary


NO GROWTH IN 2 DAYS Resulted


 


 3/4/18 14:38


Blood Peripheral Anaerobic Blood Culture - Preliminary


NO GROWTH IN 2 DAYS Resulted





 3/4/18 14:20


Blood Peripheral Aerobic Blood Culture - Preliminary


NO GROWTH IN 2 DAYS Resulted


 


 3/4/18 14:20


Blood Peripheral Anaerobic Blood Culture - Preliminary


NO GROWTH IN 2 DAYS Resulted








Physical Exam


General: A and O x 3


HEENT: PERRL NS


CHEST: LUNGS ARE EQUAL AND CLEAR


CARDIAC: RRR 


ABDOMEN: SOFT AND ACTIVE 


EXT: RIGHT HAND / ARM SWELLING BETTER, ROM FULL , ELBOW TENDER NO REDNESS





Assessment & Plan


Diagnosis:  


(1) Osteomyelitis of elbow


ICD Codes:  M86.9 - Osteomyelitis, unspecified


Status:  Acute


Plan:  Pt is responding to vancomycin , restart and continue, 





If she continues to respond should be able to dc on po abx as clinically 

improved rather soon


Will also check Uric acid level


But will continue to follow and make final recommendations before discharge





(2) Thoracic compression fracture


ICD Codes:  S22.000A - Wedge compression fracture of unspecified thoracic 

vertebra, initial encounter for closed fracture


Status:  Acute


(3) Back pain


ICD Codes:  M54.9 - Dorsalgia, unspecified


Status:  Acute





Problem Qualifiers





(1) Back pain:  








Courtney Sanchez MD Mar 6, 2018 19:01

## 2018-03-06 NOTE — MB
cc:

Igor Harvey

****

 

 

DATE OF CONSULT:

03/05/2018

 

REQUESTING PHYSICIAN:

Dr. Kiki Dominguez

 

REASON FOR CONSULTATION:

Left elbow pain and swelling

 

ADMITTING DIAGNOSIS:

PossibleOsteomyelitis, right elbow.

 

HISTORY OF PRESENT ILLNESS:

Becka is an 87-year-old female who is examined at the bedside.  She 

states that she had worsening pain in the right elbow that began last 

Tuesday and saw her primary care physician at [facility] the 

next day on Wednesday.  She was evaluated and she was put on Keflex 

and prednisone.  She continued to have swelling and Dr. Fairchild had her

sent for a bone scan at [facility] to evaluate possible 

osteomyelitis.  She arrived to the ER yesterday and due to pain in the

elbow and swelling, was admitted due to possible osteomyelitis.  She 

has no trauma or injuries to the elbow.

 

PAST MEDICAL HISTORY:

Coronary artery disease with stents, pacemaker and ICD, COPD, history 

of congestive heart failure, hypertension, hyperlipidemia, 

inflammatory polyarthropathy and a history of C. diff.

 

PAST SURGICAL HISTORY:

Cataracts, appendectomy, kyphoplasty, and bowel obstructions.

 

ALLERGIES:

SULFA, CILOSTAZOL, NIACIN.

 

SOCIAL HISTORY:

Consumes alcohol occasionally.  Rarely drinks beer.  Prior smoker, but

no longer smokes.  She lives with her niece and is semi-independent at

home.

 

MEDICATIONS AT HOME:

Albuterol/ipratropium nebulizer, baby aspirin, Coreg, 

hydrochlorothiazide, Lortab, losartan, lovastatin, temazepam, and 

alprazolam.

 

REVIEW OF SYSTEMS:

She denies chest pains, palpitations, shortness of breath, changes of 

vision, abdominal pain, difficulty urinating.  Denies swelling.  Edema

distally.  Does state that she has had right-sided sciatic symptoms 

over the past 4-6 weeks.  Denies any numbness or tingling distally.

 

PHYSICAL EXAM:

VITAL SIGNS:  Temperature is 96.5, pulse is 68, respiratory rate is 

18, blood pressure is 114/57, pulse oximetry is 92.

GENERAL:  Becka is an 87-year-old female who is well nourished, 

well-developed in no acute distress.  She does have some guarding to 

her right upper extremity.

HEENT:  She is normocephalic, atraumatic.  Pupils are equal and 

reactive to light and accommodation.  She has dentures.

NECK:  Trachea is midline with no lymphadenopathy.  Neck is supple.

PULMONARY:  No accessory muscle use.  Lungs are clear to breathing.

CARDIOVASCULAR:  Heart beat is regular with pacer.

ABDOMEN:  Soft, nondistended.

EXTREMITIES:  Right lower extremity, minimal pain with hip, knee or 

ankle range of motion.  She does have some tenderness with straight 

leg raise and has symptoms of sciatica down the right lower extremity.

 Left lower extremity, full range of motion neurovascularly intact 

with hip, knee and ankle.  Distally intact sensation with good 

capillary refills with active dorsiflexion and plantar flexion of the 

foot.  Left upper extremity, full range of motion neurovascularly 

intact in shoulder, elbow, wrist and fingers.  Full extension and full

flexion of all fingers.  She has intact sensation of her radial, ulnar

and median nerve distributions with good capillary refills.  

Examination of the right upper extremity reveals no pain with range of

motion of the shoulder or wrist or fingers.  Distally, she has intact 

sensation over the radial, ulnar and median nerve distributions with 

good capillary refills.  She can fully extend her fingers and make a 

fist.  Examination of the elbow reveals mild collection of fluid 

through the olecranon bursa.  There is no erythema or drainage.  She 

has no pain with pronation and supination of the forearm.  Elbow range

of motion is from 20 degrees to 100 degrees.  She has no tenderness 

except for the end range of motion of the elbow.

 

LABORATORY DATA:

White blood cell count is 10.1 with a hemoglobin of 11.5 and 

hematocrit of 33.8.  Neutrophils are 82.4.  Sed rate is at 92.  

C-reactive protein is 9.97.  Uric acid is high at 8.3.

 

IMAGING:

Ultrasound is obtained on 03/04/2018 of her right arm.  Spontaneous 

flow is documented in the brachial, basilic, cephalic, axillary and 

subclavian veins.  No signs of DVT.  Direction of flow in the jugular 

vein is caudal.

 

CT scan is also obtained on 03/05/2018 of the right elbow.  It says 

that there is an abnormal fluid collection along with soft tissue 

swelling over the posterior aspect of the elbow.  No osseous changes 

are identified to definitively indicate osteomyelitis.  It does refer 

to a bone scan that was performed on 03/02/2018 which does show 

increased blood flow and uptake, but is indeterminate of location 

within the elbow.  Mild joint elbow effusion.

 

ASSESSMENT:

Olecranon bursitis versus osteomyelitis versus cellulitis.

 

PLAN:

At this point, continue to take IV antibiotics and watch elbow 

progress.  She has had significant progress since last night on IV 

antibiotics and if improvement is still significant, no surgical 

intervention is necessary.  We will continue to evaluate and also 

examine bone scan to evaluate possible infection versus osteomyelitis.

 Findings and imaging are discussed with Dr. Miller and he is in 

agreement with the aforementioned plan.  We will both continue to 

follow this patient and evaluate if surgical intervention is necessary

at this time.

 

 

 

__________________________________ __________________________________

KINZA Andrade MD ML/DL/

D: 03/05/2018, 07:46 PM

T: 03/06/2018, 07:26 AM

Visit #: [id number]

Job #: [id number]





I also saw and examined this patient.  She clinically does not have any 
evidence of infection at her elbow at this time.  SHe likely has olecranon 
bursitis.  History, past medical history, social history, review of systems, 
physical exam, radiographs, assessment, and plan were also reviewed.  Plan on 
nonoperative treatment. A mid-level provider in my office (nurse practitioner 
or physician assistant) may see this patient on follow-up visits and continue 
to implement the objectives of this plan including: Starting or adjusting 
medications, injections, cast application, orthotics, brace application, 
physical therapy, radiological studies (including x-ray, MRI, CT, ultrasound, 
bone scan), vascular studies, neurologic studies, specialist consultation, and 
proceeding with surgical management, as appropriate.  
VIVIANE

## 2018-03-07 VITALS
OXYGEN SATURATION: 93 % | SYSTOLIC BLOOD PRESSURE: 121 MMHG | TEMPERATURE: 96.6 F | DIASTOLIC BLOOD PRESSURE: 66 MMHG | HEART RATE: 65 BPM | RESPIRATION RATE: 16 BRPM

## 2018-03-07 VITALS
RESPIRATION RATE: 14 BRPM | TEMPERATURE: 95.8 F | SYSTOLIC BLOOD PRESSURE: 112 MMHG | DIASTOLIC BLOOD PRESSURE: 63 MMHG | OXYGEN SATURATION: 96 % | HEART RATE: 74 BPM

## 2018-03-07 VITALS
SYSTOLIC BLOOD PRESSURE: 111 MMHG | TEMPERATURE: 96.9 F | RESPIRATION RATE: 18 BRPM | HEART RATE: 60 BPM | DIASTOLIC BLOOD PRESSURE: 53 MMHG | OXYGEN SATURATION: 92 %

## 2018-03-07 VITALS
HEART RATE: 63 BPM | OXYGEN SATURATION: 93 % | TEMPERATURE: 97.1 F | DIASTOLIC BLOOD PRESSURE: 52 MMHG | SYSTOLIC BLOOD PRESSURE: 103 MMHG | RESPIRATION RATE: 20 BRPM

## 2018-03-07 VITALS
HEART RATE: 68 BPM | RESPIRATION RATE: 14 BRPM | OXYGEN SATURATION: 96 % | DIASTOLIC BLOOD PRESSURE: 62 MMHG | SYSTOLIC BLOOD PRESSURE: 115 MMHG | TEMPERATURE: 96.2 F

## 2018-03-07 LAB
CREAT SERPL-MCNC: 0.99 MG/DL (ref 0.5–1)
CRP SERPL-MCNC: 3.2 MG/DL (ref 0–0.3)
GFR SERPLBLD BASED ON 1.73 SQ M-ARVRAT: 53 ML/MIN (ref 89–?)

## 2018-03-07 RX ADMIN — VANCOMYCIN HYDROCHLORIDE SCH MLS/HR: 1 INJECTION, SOLUTION INTRAVENOUS at 05:59

## 2018-03-07 RX ADMIN — LOSARTAN POTASSIUM SCH MG: 50 TABLET, FILM COATED ORAL at 09:22

## 2018-03-07 RX ADMIN — HYDROCODONE BITARTRATE AND ACETAMINOPHEN PRN TAB: 5; 325 TABLET ORAL at 20:43

## 2018-03-07 RX ADMIN — Medication SCH ML: at 09:22

## 2018-03-07 RX ADMIN — METHOCARBAMOL SCH MG: 500 TABLET ORAL at 13:35

## 2018-03-07 RX ADMIN — Medication SCH TAB: at 20:43

## 2018-03-07 RX ADMIN — CARVEDILOL SCH MG: 12.5 TABLET, FILM COATED ORAL at 09:22

## 2018-03-07 RX ADMIN — METHOCARBAMOL SCH MG: 500 TABLET ORAL at 05:59

## 2018-03-07 RX ADMIN — HYDROCHLOROTHIAZIDE SCH MG: 12.5 CAPSULE ORAL at 09:22

## 2018-03-07 RX ADMIN — CARVEDILOL SCH MG: 12.5 TABLET, FILM COATED ORAL at 20:43

## 2018-03-07 RX ADMIN — METHOCARBAMOL SCH MG: 500 TABLET ORAL at 20:48

## 2018-03-07 RX ADMIN — STANDARDIZED SENNA CONCENTRATE AND DOCUSATE SODIUM SCH TAB: 8.6; 5 TABLET, FILM COATED ORAL at 20:43

## 2018-03-07 RX ADMIN — HYDROCODONE BITARTRATE AND ACETAMINOPHEN PRN TAB: 5; 325 TABLET ORAL at 09:22

## 2018-03-07 RX ADMIN — STANDARDIZED SENNA CONCENTRATE AND DOCUSATE SODIUM SCH TAB: 8.6; 5 TABLET, FILM COATED ORAL at 09:22

## 2018-03-07 RX ADMIN — Medication SCH TAB: at 09:22

## 2018-03-07 RX ADMIN — HYDROCODONE BITARTRATE AND ACETAMINOPHEN PRN TAB: 5; 325 TABLET ORAL at 02:21

## 2018-03-07 RX ADMIN — Medication SCH ML: at 20:43

## 2018-03-07 RX ADMIN — ENOXAPARIN SODIUM SCH MG: 30 INJECTION SUBCUTANEOUS at 20:42

## 2018-03-07 RX ADMIN — PRAVASTATIN SODIUM SCH MG: 40 TABLET ORAL at 20:43

## 2018-03-07 RX ADMIN — ASPIRIN 81 MG SCH MG: 81 TABLET ORAL at 09:22

## 2018-03-07 NOTE — HHI.PR
Subjective


Remarks


elbow is better, back hurt after xray yesterday afternoon, better now, has not 

had PT today





Objective


Vitals





Vital Signs








  Date Time  Temp Pulse Resp B/P (MAP) Pulse Ox O2 Delivery O2 Flow Rate FiO2


 


3/7/18 11:27   18     


 


3/7/18 08:00 96.6 65 16 121/66 (84) 93   


 


3/7/18 00:00 96.9 60 18 111/53 (72) 92   


 


3/6/18 20:00 95.4 61 18 119/63 (81) 93   


 


3/6/18 16:00 96.9 64 18 97/49 (65) 93   








Result Diagram:  


3/5/18 0900                                                                    

            3/7/18 0650





Imaging





Last Impressions








Lumbar Spine X-Ray 3/6/18 0000 Signed





Impressions: 





 Service Date/Time:  Tuesday, March 6, 2018 14:24 - CONCLUSION:  1. Mild rotary 





 dextroscoliosis of the lumbar spine with associated prominent multilevel 





 degenerative spondylosis most prominently at L3-4. 2. No definitive acute 





 vertebral compression deformity.     Jw Hightower MD 


 


Upper Extremity Ultrasound 3/4/18 1427 Signed





Impressions: 





 Service Date/Time:  Sunday, March 4, 2018 15:03 - CONCLUSION:  Normal 





 examination.       Octavio David MD 


 


Upper Extremity CT 3/4/18 0000 Signed





Impressions: 





 Service Date/Time:  Monday, March 5, 2018 01:01 - CONCLUSION:  1. There is an 





 abnormal fluid collection along with soft tissue swelling and subcutaneous 

edema 





 on the posterior aspect of the elbow. 2. No osseous changes are identified to 





 definitively indicate osteomyelitis. However, the prior bone scan performed at 





 [facility] on 3/2/2018 demonstrated increased blood flow and 





 possible increased uptake in the distal humerus suspicious for osteomyelitis. 





 Therefore, consider correlating with either MRI with and without intravenous 





 contrast or tagged white blood cell study to better evaluate. 3. Elbow joint 





 effusion.     Carter Dawn MD 








Last Impressions








Upper Extremity Ultrasound 3/4/18 1427 Signed





Impressions: 





 Service Date/Time:  Sunday, March 4, 2018 15:03 - CONCLUSION:  Normal 





 examination.       Octavio David MD 


 


Upper Extremity CT 3/4/18 0000 Signed





Impressions: 





 Service Date/Time:  Monday, March 5, 2018 01:01 - CONCLUSION:  1. There is an 





 abnormal fluid collection along with soft tissue swelling and subcutaneous 

edema 





 on the posterior aspect of the elbow. 2. No osseous changes are identified to 





 definitively indicate osteomyelitis. However, the prior bone scan performed at 





 [facility] on 3/2/2018 demonstrated increased blood flow and 





 possible increased uptake in the distal humerus suspicious for osteomyelitis. 





 Therefore, consider correlating with either MRI with and without intravenous 





 contrast or tagged white blood cell study to better evaluate. 3. Elbow joint 





 effusion.     Carter Dawn MD 








Objective Remarks


alert and conversant


siiting on the edge of bed eating


heart rrr


abodmed good bowel sounds


rt elbow no edema non tender





A/P


Problem List:  


(1) Osteomyelitis of elbow


ICD Codes:  M86.9 - Osteomyelitis, unspecified


Status:  Acute


Plan:  on vancomycin


unable to get mri due to AICD


clinically improved, per ortho cont to monitor on antibiotics


ID following  cont vanco for now as she seems to be responding





(2) CAD (coronary artery disease)


ICD Codes:  I25.10 - Atherosclerotic heart disease of native coronary artery 

without angina pectoris


Status:  Chronic


Plan:  cont home medications





(3) Back pain


ICD Codes:  M54.9 - Dorsalgia, unspecified


Status:  Acute


Plan:  her main complaint


better today , xray lumbar spine shows no acute fractures, sig djd and 

osteophytes


currently feels pain free on current combination of medications, monitor


may benefit from pain management as outpatient


t/c gabapentin


cont PT











Problem Qualifiers





(1) Back pain:  








Jennifer Hills MD Mar 7, 2018 12:42

## 2018-03-07 NOTE — HHI.IDPN
Subjective


Subjective


Remarks


IPain in right leg from sciatica


Right arm better


No fever


Allergies:  


Coded Allergies:  


     Sulfa (Sulfonamide Antibiotics) (Unverified  Allergy, Severe, Rash, 3/4/18)


     cilostazol (Unverified  Allergy, Severe, Itching, 3/4/18)


     niacin (Unverified  Allergy, Severe, Rash, 3/4/18)


     sulfisoxazole (Unverified  Allergy, Severe, Rash, 3/4/18)





Review of Systems


Hematologic/Lymphatic


Heme/Lymph Remarks


no fever or chills


no NVD


NO sob


NO cough 


+ right leg / hip pain - chronic for 2months





Objective


.





Vital Signs








  Date Time  Temp Pulse Resp B/P (MAP) Pulse Ox O2 Delivery O2 Flow Rate FiO2


 


3/7/18 00:00 96.9 60 18 111/53 (72) 92   


 


3/6/18 20:00 95.4 61 18 119/63 (81) 93   


 


3/6/18 16:00 96.9 64 18 97/49 (65) 93   


 


3/6/18 12:00 96.2 54 18 96/58 (71) 92   








.





Laboratory Tests








Test


  3/5/18


09:00 3/7/18


06:50


 


White Blood Count 10.1 TH/MM3  


 


Red Blood Count 3.78 MIL/MM3  


 


Hemoglobin 11.5 GM/DL  


 


Hematocrit 33.8 %  


 


Mean Corpuscular Volume 89.3 FL  


 


Mean Corpuscular Hemoglobin 30.4 PG  


 


Mean Corpuscular Hemoglobin


Concent 34.0 % 


  


 


 


Red Cell Distribution Width 14.4 %  


 


Platelet Count 249 TH/MM3  


 


Mean Platelet Volume 7.4 FL  


 


Neutrophils (%) (Auto) 82.4 %  


 


Lymphocytes (%) (Auto) 8.3 %  


 


Monocytes (%) (Auto) 6.0 %  


 


Eosinophils (%) (Auto) 2.5 %  


 


Basophils (%) (Auto) 0.8 %  


 


Neutrophils # (Auto) 8.3 TH/MM3  


 


Lymphocytes # (Auto) 0.8 TH/MM3  


 


Monocytes # (Auto) 0.6 TH/MM3  


 


Eosinophils # (Auto) 0.3 TH/MM3  


 


Basophils # (Auto) 0.1 TH/MM3  


 


CBC Comment DIFF FINAL  


 


Differential Comment   








Laboratory Tests








Test


  3/6/18


05:50 3/7/18


06:50


 


Creatinine 1.10 MG/DL  0.99 MG/DL 


 


Estimat Glomerular Filtration


Rate 47 ML/MIN 


  53 ML/MIN 


 








Microbiology








 Date/Time


Source Procedure


Growth Status


 


 


 3/4/18 14:38


Blood Peripheral Aerobic Blood Culture - Preliminary


NO GROWTH IN 2 DAYS Resulted


 


 3/4/18 14:38


Blood Peripheral Anaerobic Blood Culture - Preliminary


NO GROWTH IN 2 DAYS Resulted





 3/4/18 14:20


Blood Peripheral Aerobic Blood Culture - Preliminary


NO GROWTH IN 2 DAYS Resulted


 


 3/4/18 14:20


Blood Peripheral Anaerobic Blood Culture - Preliminary


NO GROWTH IN 2 DAYS Resulted








Physical Exam


General: A and O x 3


HEENT: PERRL NS


CHEST: LUNGS ARE EQUAL AND CLEAR


CARDIAC: RRR 


ABDOMEN: SOFT AND ACTIVE 


EXT: RIGHT HAND / ARM SWELLING BETTER, ROM FULL , ELBOW TENDER NO REDNESS





Assessment & Plan


Diagnosis:  


(1) Osteomyelitis of elbow


ICD Codes:  M86.9 - Osteomyelitis, unspecified


Status:  Acute


Plan:  Pt is responding to vancomycin , - continue for now





If she continues to respond should be able to dc on po abx in 24- 48 hrs





(2) Thoracic compression fracture


ICD Codes:  S22.000A - Wedge compression fracture of unspecified thoracic 

vertebra, initial encounter for closed fracture


Status:  Acute


(3) Back pain


ICD Codes:  M54.9 - Dorsalgia, unspecified


Status:  Acute





Problem Qualifiers





(1) Back pain:  








Courtney Sanchez MD Mar 7, 2018 08:36

## 2018-03-08 VITALS
HEART RATE: 62 BPM | SYSTOLIC BLOOD PRESSURE: 133 MMHG | DIASTOLIC BLOOD PRESSURE: 67 MMHG | RESPIRATION RATE: 15 BRPM | OXYGEN SATURATION: 96 % | TEMPERATURE: 96.8 F

## 2018-03-08 VITALS
OXYGEN SATURATION: 93 % | DIASTOLIC BLOOD PRESSURE: 51 MMHG | SYSTOLIC BLOOD PRESSURE: 110 MMHG | HEART RATE: 60 BPM | RESPIRATION RATE: 20 BRPM | TEMPERATURE: 97.2 F

## 2018-03-08 VITALS
SYSTOLIC BLOOD PRESSURE: 142 MMHG | DIASTOLIC BLOOD PRESSURE: 65 MMHG | OXYGEN SATURATION: 93 % | RESPIRATION RATE: 18 BRPM | HEART RATE: 63 BPM | TEMPERATURE: 97.1 F

## 2018-03-08 VITALS
RESPIRATION RATE: 20 BRPM | HEART RATE: 67 BPM | DIASTOLIC BLOOD PRESSURE: 73 MMHG | TEMPERATURE: 96.6 F | SYSTOLIC BLOOD PRESSURE: 144 MMHG | OXYGEN SATURATION: 95 %

## 2018-03-08 VITALS
DIASTOLIC BLOOD PRESSURE: 95 MMHG | OXYGEN SATURATION: 94 % | SYSTOLIC BLOOD PRESSURE: 138 MMHG | RESPIRATION RATE: 20 BRPM | TEMPERATURE: 97 F | HEART RATE: 76 BPM

## 2018-03-08 LAB
CREAT SERPL-MCNC: 1 MG/DL (ref 0.5–1)
GFR SERPLBLD BASED ON 1.73 SQ M-ARVRAT: 52 ML/MIN (ref 89–?)

## 2018-03-08 RX ADMIN — GABAPENTIN SCH MG: 100 CAPSULE ORAL at 13:13

## 2018-03-08 RX ADMIN — STANDARDIZED SENNA CONCENTRATE AND DOCUSATE SODIUM SCH TAB: 8.6; 5 TABLET, FILM COATED ORAL at 09:58

## 2018-03-08 RX ADMIN — PRAVASTATIN SODIUM SCH MG: 40 TABLET ORAL at 20:58

## 2018-03-08 RX ADMIN — ENOXAPARIN SODIUM SCH MG: 30 INJECTION SUBCUTANEOUS at 20:59

## 2018-03-08 RX ADMIN — HYDROCODONE BITARTRATE AND ACETAMINOPHEN PRN TAB: 5; 325 TABLET ORAL at 20:58

## 2018-03-08 RX ADMIN — Medication SCH ML: at 09:59

## 2018-03-08 RX ADMIN — CARVEDILOL SCH MG: 12.5 TABLET, FILM COATED ORAL at 20:59

## 2018-03-08 RX ADMIN — HYDROCODONE BITARTRATE AND ACETAMINOPHEN PRN TAB: 5; 325 TABLET ORAL at 04:57

## 2018-03-08 RX ADMIN — Medication SCH TAB: at 09:59

## 2018-03-08 RX ADMIN — Medication SCH TAB: at 20:58

## 2018-03-08 RX ADMIN — HYDROCHLOROTHIAZIDE SCH MG: 12.5 CAPSULE ORAL at 09:58

## 2018-03-08 RX ADMIN — METHOCARBAMOL SCH MG: 500 TABLET ORAL at 04:57

## 2018-03-08 RX ADMIN — LOSARTAN POTASSIUM SCH MG: 50 TABLET, FILM COATED ORAL at 09:59

## 2018-03-08 RX ADMIN — STANDARDIZED SENNA CONCENTRATE AND DOCUSATE SODIUM SCH TAB: 8.6; 5 TABLET, FILM COATED ORAL at 20:59

## 2018-03-08 RX ADMIN — VANCOMYCIN HYDROCHLORIDE SCH MLS/HR: 1 INJECTION, SOLUTION INTRAVENOUS at 18:41

## 2018-03-08 RX ADMIN — HYDROCODONE BITARTRATE AND ACETAMINOPHEN PRN TAB: 5; 325 TABLET ORAL at 13:13

## 2018-03-08 RX ADMIN — ASPIRIN 81 MG SCH MG: 81 TABLET ORAL at 09:59

## 2018-03-08 RX ADMIN — Medication SCH ML: at 20:29

## 2018-03-08 RX ADMIN — GABAPENTIN SCH MG: 100 CAPSULE ORAL at 18:45

## 2018-03-08 RX ADMIN — CARVEDILOL SCH MG: 12.5 TABLET, FILM COATED ORAL at 09:59

## 2018-03-08 NOTE — PD.ORT.PN
Subjective


Subjective Remarks


Becka is awake and alert.  Her right elbow pain is improving.  Her swelling has 

also improved.





Objective


Vitals





Vital Signs








  Date Time  Temp Pulse Resp B/P (MAP) Pulse Ox O2 Delivery O2 Flow Rate FiO2


 


3/8/18 09:21 96.8 62 15 133/67 (89) 96   


 


3/8/18 00:00 97.2 60 20 110/51 (70) 93   


 


3/7/18 20:00 97.1 63 20 103/52 (69) 93   


 


3/7/18 16:00 96.2 68 14 115/62 (79) 96   


 


3/7/18 12:00 95.8 74 14 112/63 (79) 96   


 


3/7/18 11:27   18     














I/O      


 


 3/7/18 3/7/18 3/7/18 3/8/18 3/8/18 3/8/18





 07:00 15:00 23:00 07:00 15:00 23:00


 


Intake Total 120 ml  240 ml   


 


Balance 120 ml  240 ml   


 


      


 


Intake Oral 120 ml  240 ml   


 


# Voids 1  2   


 


# Bowel Movements   1   








Result Diagram:  


3/5/18 0900                                                                    

            3/8/18 0516





Imaging





Last 72 hours Impressions








Upper Extremity Ultrasound 3/4/18 1427 Signed





Impressions: 





 Service Date/Time:  Sunday, March 4, 2018 15:03 - CONCLUSION:  Normal 





 examination.       Octavio David MD 


 


Upper Extremity CT 3/4/18 0000 Signed





Impressions: 





 Service Date/Time:  Monday, March 5, 2018 01:01 - CONCLUSION:  1. There is an 





 abnormal fluid collection along with soft tissue swelling and subcutaneous 

edema 





 on the posterior aspect of the elbow. 2. No osseous changes are identified to 





 definitively indicate osteomyelitis. However, the prior bone scan performed at 





 [facility] on 3/2/2018 demonstrated increased blood flow and 





 possible increased uptake in the distal humerus suspicious for osteomyelitis. 





 Therefore, consider correlating with either MRI with and without intravenous 





 contrast or tagged white blood cell study to better evaluate. 3. Elbow joint 





 effusion.     Carter Dawn MD 








Objective Remarks


Left upper extremity: Full range of motion of shoulder elbow and wrist without 

pain





Bilateral lower extremities intact sensation distally with good capillary 

refills. Strong dorsiflexion and plantar flexion bilateral feet.  Sensation is 

intact in both feet.





Right upper extremity: She has no pain with range of motion of the shoulder. 

Distally she has intact sensation over the radial ulnar and median nerve 

distributions with good capillary refills. She's able fully extend her fingers 

and make a fist. Examination of the elbow does show mild swelling but no 

fluctuance over the olecranon bursa. There is no erythema. No drainage is 

noted. Elbow range of motion is from 20 to 120. She has no pain with 

pronation or supination of the forearm. She has minimal pain with range of 

motion of the elbow





Assessment & Plan


Assessment and Plan


Olecranon bursitis-- no clinical signs or symptoms of elbow infection


Continue IV antibiotics per infectious disease


No surgery needed at this time.


Occupational therapy for gentle range of motion of the elbow











Eber Miller MD Mar 8, 2018 11:16

## 2018-03-08 NOTE — HHI.IDPN
Subjective


Subjective


Remarks


no fever 


right elbow is better 


swelling down


Allergies:  


Coded Allergies:  


     Sulfa (Sulfonamide Antibiotics) (Unverified  Allergy, Severe, Rash, 3/4/18)


     cilostazol (Unverified  Allergy, Severe, Itching, 3/4/18)


     niacin (Unverified  Allergy, Severe, Rash, 3/4/18)


     sulfisoxazole (Unverified  Allergy, Severe, Rash, 3/4/18)





Objective


.





Vital Signs








  Date Time  Temp Pulse Resp B/P (MAP) Pulse Ox O2 Delivery O2 Flow Rate FiO2


 


3/8/18 17:29 97.0 76 20 138/95 (109) 94   


 


3/8/18 12:10 97.1 63 18 142/65 (90) 93   


 


3/8/18 09:21 96.8 62 15 133/67 (89) 96   


 


3/8/18 00:00 97.2 60 20 110/51 (70) 93   


 


3/7/18 20:00 97.1 63 20 103/52 (69) 93   














 3/8/18 3/8/18 3/9/18





 15:00 23:00 07:00


 


Intake Total  1000 ml 


 


Balance  1000 ml 


 


   


 


Intake Oral  1000 ml 


 


# Voids  3 


 


# Bowel Movements  1 








.





Laboratory Tests








Test


  3/7/18


06:50


 


Erythrocyte Sedimentation Rate 77 mm/hr 








Laboratory Tests








Test


  3/7/18


06:50 3/8/18


05:16


 


Creatinine 0.99 MG/DL  1.00 MG/DL 


 


Estimat Glomerular Filtration


Rate 53 ML/MIN 


  52 ML/MIN 


 


 


C-Reactive Protein 3.20 MG/DL  








Physical Exam


General: A and O x 3


HEENT: PERRL NS


CHEST: LUNGS ARE EQUAL AND CLEAR


CARDIAC: RRR 


ABDOMEN: SOFT AND ACTIVE 


EXT: RIGHT HAND / ARM SWELLING BETTER, ROM FULL , ELBOW TENDER NO REDNESS





Assessment & Plan


Diagnosis:  


(1) CAD (coronary artery disease)


ICD Codes:  I25.10 - Atherosclerotic heart disease of native coronary artery 

without angina pectoris


Status:  Chronic


(2) Thoracic compression fracture


ICD Codes:  S22.000A - Wedge compression fracture of unspecified thoracic 

vertebra, initial encounter for closed fracture


Status:  Acute


(3) Osteomyelitis of elbow


ICD Codes:  M86.9 - Osteomyelitis, unspecified


Status:  Acute


Plan:  will continue vancomycin 


doxycyline on Hermelinda Garcia Mar 8, 2018 18:20

## 2018-03-08 NOTE — HHI.PR
Subjective


Remarks


Elbow is better, still with back pain, says did ok yesterday until 5 am when 

she woke with back pain, denies pain at this moment





Objective


Vitals





Vital Signs








  Date Time  Temp Pulse Resp B/P (MAP) Pulse Ox O2 Delivery O2 Flow Rate FiO2


 


3/8/18 09:21 96.8 62 15 133/67 (89) 96   


 


3/8/18 00:00 97.2 60 20 110/51 (70) 93   


 


3/7/18 20:00 97.1 63 20 103/52 (69) 93   


 


3/7/18 16:00 96.2 68 14 115/62 (79) 96   


 


3/7/18 12:00 95.8 74 14 112/63 (79) 96   








Result Diagram:  


3/5/18 0900                                                                    

            3/8/18 0516





Imaging





Last Impressions








Lumbar Spine X-Ray 3/6/18 0000 Signed





Impressions: 





 Service Date/Time:  Tuesday, March 6, 2018 14:24 - CONCLUSION:  1. Mild rotary 





 dextroscoliosis of the lumbar spine with associated prominent multilevel 





 degenerative spondylosis most prominently at L3-4. 2. No definitive acute 





 vertebral compression deformity.     Jw Hightower MD 


 


Upper Extremity Ultrasound 3/4/18 1427 Signed





Impressions: 





 Service Date/Time:  Sunday, March 4, 2018 15:03 - CONCLUSION:  Normal 





 examination.       Octavio David MD 


 


Upper Extremity CT 3/4/18 0000 Signed





Impressions: 





 Service Date/Time:  Monday, March 5, 2018 01:01 - CONCLUSION:  1. There is an 





 abnormal fluid collection along with soft tissue swelling and subcutaneous 

edema 





 on the posterior aspect of the elbow. 2. No osseous changes are identified to 





 definitively indicate osteomyelitis. However, the prior bone scan performed at 





 [facility] on 3/2/2018 demonstrated increased blood flow and 





 possible increased uptake in the distal humerus suspicious for osteomyelitis. 





 Therefore, consider correlating with either MRI with and without intravenous 





 contrast or tagged white blood cell study to better evaluate. 3. Elbow joint 





 effusion.     Carter Dawn MD 








Last Impressions








Upper Extremity Ultrasound 3/4/18 1427 Signed





Impressions: 





 Service Date/Time:  Sunday, March 4, 2018 15:03 - CONCLUSION:  Normal 





 examination.       Octavio David MD 


 


Upper Extremity CT 3/4/18 0000 Signed





Impressions: 





 Service Date/Time:  Monday, March 5, 2018 01:01 - CONCLUSION:  1. There is an 





 abnormal fluid collection along with soft tissue swelling and subcutaneous 

edema 





 on the posterior aspect of the elbow. 2. No osseous changes are identified to 





 definitively indicate osteomyelitis. However, the prior bone scan performed at 





 [facility] on 3/2/2018 demonstrated increased blood flow and 





 possible increased uptake in the distal humerus suspicious for osteomyelitis. 





 Therefore, consider correlating with either MRI with and without intravenous 





 contrast or tagged white blood cell study to better evaluate. 3. Elbow joint 





 effusion.     Carter Dawn MD 








Objective Remarks


alert and conversant


siiting on the edge of bed 


heart rrr


abodmed good bowel sounds


rt elbow no edema non tender


ambulated with stooped posture





A/P


Problem List:  


(1) Osteomyelitis of elbow


ICD Codes:  M86.9 - Osteomyelitis, unspecified


Status:  Acute


Plan:  on vancomycin


unable to get mri due to AICD


clinically improved, per ortho cont to monitor on antibiotics


ID following  cont vanco for now as she seems to be responding


should be able to d/c on po antibiotics today or tommorow if ok with ID





(2) CAD (coronary artery disease)


ICD Codes:  I25.10 - Atherosclerotic heart disease of native coronary artery 

without angina pectoris


Status:  Chronic


Plan:  cont home medications





(3) Back pain


ICD Codes:  M54.9 - Dorsalgia, unspecified


Status:  Acute


Plan:  her main complaint


better now states she did ok yesterday but had episode of severe pain that woke 

her this am


will need pain management as outpatient


will place on gabapentin and monitor response








Problem Qualifiers





(1) Back pain:  








Jennifer Hills MD Mar 8, 2018 11:40

## 2018-03-09 VITALS
OXYGEN SATURATION: 94 % | DIASTOLIC BLOOD PRESSURE: 76 MMHG | SYSTOLIC BLOOD PRESSURE: 150 MMHG | HEART RATE: 80 BPM | TEMPERATURE: 96.2 F | RESPIRATION RATE: 18 BRPM

## 2018-03-09 VITALS
SYSTOLIC BLOOD PRESSURE: 148 MMHG | HEART RATE: 68 BPM | DIASTOLIC BLOOD PRESSURE: 79 MMHG | OXYGEN SATURATION: 96 % | RESPIRATION RATE: 20 BRPM | TEMPERATURE: 96.7 F

## 2018-03-09 RX ADMIN — GABAPENTIN SCH MG: 100 CAPSULE ORAL at 09:10

## 2018-03-09 RX ADMIN — CARVEDILOL SCH MG: 12.5 TABLET, FILM COATED ORAL at 09:10

## 2018-03-09 RX ADMIN — STANDARDIZED SENNA CONCENTRATE AND DOCUSATE SODIUM SCH TAB: 8.6; 5 TABLET, FILM COATED ORAL at 09:11

## 2018-03-09 RX ADMIN — LOSARTAN POTASSIUM SCH MG: 50 TABLET, FILM COATED ORAL at 09:11

## 2018-03-09 RX ADMIN — HYDROCHLOROTHIAZIDE SCH MG: 12.5 CAPSULE ORAL at 09:11

## 2018-03-09 RX ADMIN — Medication SCH TAB: at 09:10

## 2018-03-09 RX ADMIN — ASPIRIN 81 MG SCH MG: 81 TABLET ORAL at 09:10

## 2018-03-09 RX ADMIN — Medication SCH ML: at 09:11

## 2018-03-09 NOTE — HHI.FF
Face to Face Verification


Diagnosis:  


(1) Osteomyelitis of elbow


(2) Back pain


(3) CAD (coronary artery disease)


Physical Therapy


Order:  Evaluate and Treat, Improve ambulation


Instructions:


ROM Right elbow











I have seen patient Becka Duffy on 3/9/18. My clinical findings 

support the need for the requested home health care services because:








 Deconditioned w/ increased weakness





 High risk of falls














I certify that my clinical findings support that this patient is homebound 

because:








 Unsteady gait/balance

















Jennifer Hills MD Mar 9, 2018 11:01

## 2018-03-09 NOTE — HHI.PR
Subjective


Remarks


no complaint, slept well. states she did better with PT. Arm a little stiff





Objective


Vitals





Vital Signs








  Date Time  Temp Pulse Resp B/P (MAP) Pulse Ox O2 Delivery O2 Flow Rate FiO2


 


3/9/18 08:00 96.2 80 18 150/76 (100) 94   


 


3/9/18 00:00 96.7 68 20 148/79 (102) 96   


 


3/8/18 20:00 96.6 67 20 144/73 (96) 95   


 


3/8/18 17:29 97.0 76 20 138/95 (109) 94   


 


3/8/18 12:10 97.1 63 18 142/65 (90) 93   














 3/9/18 3/9/18 3/10/18





 15:00 23:00 07:00


 


   


 


# Voids 1  


 


# Bowel Movements 1  








Result Diagram:  


3/5/18 0900                                                                    

            3/9/18 0840





Imaging





Last Impressions








Lumbar Spine X-Ray 3/6/18 0000 Signed





Impressions: 





 Service Date/Time:  Tuesday, March 6, 2018 14:24 - CONCLUSION:  1. Mild rotary 





 dextroscoliosis of the lumbar spine with associated prominent multilevel 





 degenerative spondylosis most prominently at L3-4. 2. No definitive acute 





 vertebral compression deformity.     Jw Hightower MD 


 


Upper Extremity Ultrasound 3/4/18 1427 Signed





Impressions: 





 Service Date/Time:  Sunday, March 4, 2018 15:03 - CONCLUSION:  Normal 





 examination.       Octavio David MD 


 


Upper Extremity CT 3/4/18 0000 Signed





Impressions: 





 Service Date/Time:  Monday, March 5, 2018 01:01 - CONCLUSION:  1. There is an 





 abnormal fluid collection along with soft tissue swelling and subcutaneous 

edema 





 on the posterior aspect of the elbow. 2. No osseous changes are identified to 





 definitively indicate osteomyelitis. However, the prior bone scan performed at 





 [facility] on 3/2/2018 demonstrated increased blood flow and 





 possible increased uptake in the distal humerus suspicious for osteomyelitis. 





 Therefore, consider correlating with either MRI with and without intravenous 





 contrast or tagged white blood cell study to better evaluate. 3. Elbow joint 





 effusion.     Carter Dawn MD 








Last Impressions








Upper Extremity Ultrasound 3/4/18 1427 Signed





Impressions: 





 Service Date/Time:  Sunday, March 4, 2018 15:03 - CONCLUSION:  Normal 





 examination.       Octavio David MD 


 


Upper Extremity CT 3/4/18 0000 Signed





Impressions: 





 Service Date/Time:  Monday, March 5, 2018 01:01 - CONCLUSION:  1. There is an 





 abnormal fluid collection along with soft tissue swelling and subcutaneous 

edema 





 on the posterior aspect of the elbow. 2. No osseous changes are identified to 





 definitively indicate osteomyelitis. However, the prior bone scan performed at 





 [facility] on 3/2/2018 demonstrated increased blood flow and 





 possible increased uptake in the distal humerus suspicious for osteomyelitis. 





 Therefore, consider correlating with either MRI with and without intravenous 





 contrast or tagged white blood cell study to better evaluate. 3. Elbow joint 





 effusion.     Carter Dawn MD 








Objective Remarks


lying in bed, looks comfortable


lungs cta


heart rrr


Good bowel sounds,


 elbow no erythema or edema nontender





A/P


Problem List:  


(1) Osteomyelitis of elbow


ICD Codes:  M86.9 - Osteomyelitis, unspecified


Status:  Acute


Plan:  on vancomycin


unable to get mri due to AICD


clinically improved, per ortho cont to monitor on antibiotics


seen By ID last pm and per phone discussion she was ready for discharge on 

doxycycline with f/u with them in 2 weeks





(2) CAD (coronary artery disease)


ICD Codes:  I25.10 - Atherosclerotic heart disease of native coronary artery 

without angina pectoris


Status:  Chronic


Plan:  cont home medications





(3) Back pain


ICD Codes:  M54.9 - Dorsalgia, unspecified


Status:  Acute


Plan:  denies pain right now


slept well last pm


gabapentin low dose added yesterday


will need pain management as outpatient


willl have c PT see her to help with ambulation





Discharge Planning


discharge home today with c PT.





Problem Qualifiers





(1) Back pain:  








Jennifer Hills MD Mar 9, 2018 10:27

## 2018-03-09 NOTE — HHI.DS
Discharge Summary


Admission Date


Mar 8, 2018 at 12:15


Admitting Diagnosis





Osteomyelitis of the rt elbow.





(1) Back pain


ICD Codes:  M54.9 - Dorsalgia, unspecified


Status:  Acute


(2) Osteomyelitis of elbow


Diagnosis:  Principal


ICD Codes:  M86.9 - Osteomyelitis, unspecified


Status:  Acute


(3) CAD (coronary artery disease)


Diagnosis:  Principal


ICD Codes:  I25.10 - Atherosclerotic heart disease of native coronary artery 

without angina pectoris


Status:  Chronic


CBC/BMP:  


3/5/18 0900                                                                    

            3/9/18 0840





Significant Findings





Laboratory Tests








Test


  3/7/18


06:50 3/8/18


05:16 3/9/18


08:40


 


Erythrocyte Sedimentation Rate


  77 mm/hr


(0-30) 


  


 


 


Estimat Glomerular Filtration


Rate 53 ML/MIN


(>89) 52 ML/MIN


(>89) 47 ML/MIN


(>89)


 


C-Reactive Protein


  3.20 MG/DL


(0.00-0.30) 


  


 


 


Creatinine


  


  


  1.10 MG/DL


(0.50-1.00)








PE at Discharge


lying in bed, looks comfortable


lungs cta


heart rrr


Good bowel sounds,


 elbow no erythema or edema nontender


Pt Condition on Discharge:  Fair


Discharge Disposition:  Disch w/ Home Health Serv


Discharge Instructions


DIET: Follow Instructions for:  Heart Healthy Diet


Activities you can perform:  Regular-No Restrictions


Follow up Referrals:  


Infectious Disease - 2 Weeks with Courtney Sanchez MD


PCP Follow-up - 2 Weeks with Courtney Sanchez MD





New Medications:  


Doxycycline Hyclate (Doxycycline Hyclate) 100 Mg Tab


100 MG PO Q12HR for cellulitis for 14 Days, #28 TAB





Gabapentin (Gabapentin) 100 Mg Cap


100 MG PO TID for radiculopathy for 30 Days, #90 CAP





 


Continued Medications:  


Albuterol 18 GM Inh (Ventolin Hfa 18 GM Inh) 90 Mcg/Act Aer


2 PUFF INH Q6H PRN for SHORTNESS OF BREATH, #1 INHALER 0 Refills





Alprazolam (Alprazolam) 0.5 Mg Tab


0.5 MG PO BID PRN for ANXIETY, TAB 0 Refills





Aspirin (Aspirin Children's) 81 Mg Chew


81 MG CHEW DAILY, TAB 0 Refills





Carvedilol (Carvedilol) 12.5 Mg Tab


12.5 MG PO BID, #60 TAB 0 Refills





Hydrochlorothiazide (Hydrochlorothiazide) 12.5 Mg Cap


12.5 MG PO DAILY, #30 CAP 0 Refills





Hydrocodone-Acetaminophen (Hydrocodone-Acetaminophen) 5-325 mg Tab


1 TAB PO Q6H PRN for PAIN, TAB 0 Refills





Losartan (Losartan) 50 Mg Tab


50 MG PO DAILY for Blood Pressure Management, #30 TAB 0 Refills





Lovastatin (Lovastatin) 40 Mg Tab


80 MG PO DAILY for Cholesterol Management, #60 TAB 0 Refills





Multiple Vitamin (Multiple Vitamin) 1 Tab


1 TAB PO DAILY for Nutritional Supplement, TAB 0 Refills





Temazepam (Temazepam) 15 Mg Cap


15 MG PO HS PRN for INSOMNIA, #30 CAP 0 Refills





[Albuterol Nebs] ()  








[Ipratropium Bromide] ()  








 


Discontinued Medications:  


Tramadol (Tramadol) 50 Mg Tab


50 MG PO HS PRN for PAIN, TAB 0 Refills





[Promethazinie-Codei] ()  




















Jennifer Hills MD Mar 9, 2018 10:59

## 2018-05-08 ENCOUNTER — HOSPITAL ENCOUNTER (INPATIENT)
Dept: HOSPITAL 17 - NEPC | Age: 83
LOS: 4 days | Discharge: HOME HEALTH | DRG: 191 | End: 2018-05-12
Payer: MEDICARE

## 2018-05-08 DIAGNOSIS — Z79.899: ICD-10-CM

## 2018-05-08 DIAGNOSIS — Z95.5: ICD-10-CM

## 2018-05-08 DIAGNOSIS — Z79.891: ICD-10-CM

## 2018-05-08 DIAGNOSIS — F41.9: ICD-10-CM

## 2018-05-08 DIAGNOSIS — Z88.8: ICD-10-CM

## 2018-05-08 DIAGNOSIS — I25.10: ICD-10-CM

## 2018-05-08 DIAGNOSIS — E78.00: ICD-10-CM

## 2018-05-08 DIAGNOSIS — Z95.0: ICD-10-CM

## 2018-05-08 DIAGNOSIS — K59.00: ICD-10-CM

## 2018-05-08 DIAGNOSIS — J44.1: Primary | ICD-10-CM

## 2018-05-08 DIAGNOSIS — I25.2: ICD-10-CM

## 2018-05-08 DIAGNOSIS — M48.54XA: ICD-10-CM

## 2018-05-08 DIAGNOSIS — R09.02: ICD-10-CM

## 2018-05-08 DIAGNOSIS — Z79.82: ICD-10-CM

## 2018-05-08 DIAGNOSIS — G40.909: ICD-10-CM

## 2018-05-08 DIAGNOSIS — Z72.0: ICD-10-CM

## 2018-05-08 DIAGNOSIS — I10: ICD-10-CM

## 2018-05-08 DIAGNOSIS — Z88.2: ICD-10-CM

## 2018-05-08 DIAGNOSIS — K21.9: ICD-10-CM

## 2018-05-08 LAB
ALBUMIN: 3.3 GM/DL (ref 3.4–5)
ALKALINE PHOSPHATASE: 83 U/L (ref 45–117)
ALT (GPT): 17 U/L (ref 10–53)
ANION GAP: 9 MEQ/L (ref 5–15)
APTT (PATIENT): 24.5 SEC (ref 24.3–30.1)
AST (GOT): 16 U/L (ref 15–37)
AUTOMATED NEUTROPHIL #: 20.7 TH/MM3 (ref 1.8–7.7)
BASE EXCESS BLD CALC-SCNC: 2.6 MMOL/L (ref -2–2)
BASOPHIL #: 0.1 TH/MM3 (ref 0–0.2)
BASOPHIL %: 0.4 % (ref 0–2)
BICARBONATE: 25.5 MEQ/L (ref 21–32)
BILIRUB SERPL-MCNC: 0.4 MG/DL (ref 0.2–1)
BLOOD GAS CARBOXYHEMOGLOBIN: 1.2 % (ref 0–4)
BLOOD GAS HCO3: 27 MMOL/L (ref 22–26)
BLOOD GAS METHEMOGLOBIN: 0.6 % (ref 0–2)
BLOOD GAS O2 HGB SATURATION: 82 % (ref 90–100)
BLOOD GAS OXYGEN CONTENT: 15 VOL % (ref 12–20)
BLOOD GAS PCO2: 47 MMHG (ref 38–42)
BLOOD UREA NITROGEN: 28 MG/DL (ref 7–18)
BNP SERPL-MCNC: 171 PG/ML (ref 0–100)
CALCIUM: 9.4 MG/DL (ref 8.5–10.1)
CARBAMAZEPINE SERPL-MCNC: 46 MMHG (ref 61–120)
CHLORIDE: 103 MEQ/L (ref 98–107)
CREATINE KINASE: 44 U/L (ref 26–192)
CREATININE: 1.18 MG/DL (ref 0.5–1)
CRITICAL VALUE: YES
DRAW SITE: (no result)
EOSINOPHIL #: 0.1 TH/MM3 (ref 0–0.4)
EOSINOPHIL %: 0.5 % (ref 0–4)
FIO2: 21 %
GLOMERULAR FILTRATION RATE: 43 ML/MIN (ref 89–?)
GLUCOSE,RANDOM: 90 MG/DL (ref 74–106)
HEMATOCRIT: 42.1 % (ref 35–46)
HEMO FLAGS: (no result)
HEMOGLOBIN: 13.7 GM/DL (ref 11.6–15.3)
INTERNATIONAL NORMALIZED RATIO: 1 RATIO
LACTIC ACID: 1.7 MMOL/L (ref 0.4–2)
LYMPH %: 3.6 % (ref 9–44)
LYMPHOCYTE #: 0.8 TH/MM3 (ref 1–4.8)
MEAN CELL VOLUME: 88 FL (ref 80–100)
MEAN CORPUSCULAR HEMOGLOBIN: 28.6 PG (ref 27–34)
MEAN CORPUSCULAR HGB CONC: 32.5 % (ref 32–36)
MEAN PLATELET VOLUME: 8.9 FL (ref 7–11)
MONO %: 3.7 % (ref 0–8)
MONOCYTE #: 0.8 TH/MM3 (ref 0–0.9)
NEUT %: 91.8 % (ref 16–70)
NUMBER OF ARTERIAL PUNCTURES: 1
OXYGEN DEVICE: (no result)
PLATELET COUNT: 123 TH/MM3 (ref 150–450)
POTASSIUM: 4.1 MEQ/L (ref 3.5–5.1)
PROTHROMBIN TIME - PATIENT: 10 SEC (ref 9.8–11.6)
RED BLOOD COUNT: 4.79 MIL/MM3 (ref 4–5.3)
RED CELL DISTRIBUTION WIDTH: 14.2 % (ref 11.6–17.2)
SALICYLATES SERPL-MCNC: 13.1 G/DL (ref 12–16)
SODIUM (NA): 137 MEQ/L (ref 136–145)
STAT: YES
TEMP CORR TO: 98.6
TOTAL PROTEIN: 7.3 GM/DL (ref 6.4–8.2)
TROPONIN I: (no result) NG/ML (ref 0.02–0.05)
ULNAR PULSE: PRESENT
WHITE BLOOD COUNT: 22.6 TH/MM3 (ref 4–11)

## 2018-05-08 PROCEDURE — 83605 ASSAY OF LACTIC ACID: CPT

## 2018-05-08 PROCEDURE — 97162 PT EVAL MOD COMPLEX 30 MIN: CPT

## 2018-05-08 PROCEDURE — 97110 THERAPEUTIC EXERCISES: CPT

## 2018-05-08 PROCEDURE — 97530 THERAPEUTIC ACTIVITIES: CPT

## 2018-05-08 PROCEDURE — 87804 INFLUENZA ASSAY W/OPTIC: CPT

## 2018-05-08 PROCEDURE — 80053 COMPREHEN METABOLIC PANEL: CPT

## 2018-05-08 PROCEDURE — 99285 EMERGENCY DEPT VISIT HI MDM: CPT

## 2018-05-08 PROCEDURE — 85025 COMPLETE CBC W/AUTO DIFF WBC: CPT

## 2018-05-08 PROCEDURE — 94640 AIRWAY INHALATION TREATMENT: CPT

## 2018-05-08 PROCEDURE — 71250 CT THORAX DX C-: CPT

## 2018-05-08 PROCEDURE — 82550 ASSAY OF CK (CPK): CPT

## 2018-05-08 PROCEDURE — 85730 THROMBOPLASTIN TIME PARTIAL: CPT

## 2018-05-08 PROCEDURE — 80048 BASIC METABOLIC PNL TOTAL CA: CPT

## 2018-05-08 PROCEDURE — 36600 WITHDRAWAL OF ARTERIAL BLOOD: CPT

## 2018-05-08 PROCEDURE — 84484 ASSAY OF TROPONIN QUANT: CPT

## 2018-05-08 PROCEDURE — 82805 BLOOD GASES W/O2 SATURATION: CPT

## 2018-05-08 PROCEDURE — 71046 X-RAY EXAM CHEST 2 VIEWS: CPT

## 2018-05-08 PROCEDURE — 93005 ELECTROCARDIOGRAM TRACING: CPT

## 2018-05-08 PROCEDURE — 87040 BLOOD CULTURE FOR BACTERIA: CPT

## 2018-05-08 PROCEDURE — 96375 TX/PRO/DX INJ NEW DRUG ADDON: CPT

## 2018-05-08 PROCEDURE — 96374 THER/PROPH/DIAG INJ IV PUSH: CPT

## 2018-05-08 PROCEDURE — 94664 DEMO&/EVAL PT USE INHALER: CPT

## 2018-05-08 PROCEDURE — 94150 VITAL CAPACITY TEST: CPT

## 2018-05-08 PROCEDURE — 85610 PROTHROMBIN TIME: CPT

## 2018-05-08 PROCEDURE — 71045 X-RAY EXAM CHEST 1 VIEW: CPT

## 2018-05-08 PROCEDURE — 83880 ASSAY OF NATRIURETIC PEPTIDE: CPT

## 2018-05-08 RX ADMIN — CARVEDILOL 1 MG: 12.5 TABLET, FILM COATED ORAL at 17:51

## 2018-05-08 RX ADMIN — BUDESONIDE AND FORMOTEROL FUMARATE DIHYDRATE 1 PUFF: 160; 4.5 AEROSOL RESPIRATORY (INHALATION) at 13:15

## 2018-05-08 RX ADMIN — CARVEDILOL 1 MG: 12.5 TABLET, FILM COATED ORAL at 20:42

## 2018-05-08 RX ADMIN — DOCUSATE SODIUM 1 MG: 100 CAPSULE, LIQUID FILLED ORAL at 20:42

## 2018-05-08 RX ADMIN — ONDANSETRON 1 MG: 2 INJECTION, SOLUTION INTRAMUSCULAR; INTRAVENOUS at 11:09

## 2018-05-08 RX ADMIN — ASPIRIN 81 MG 1 MG: 81 TABLET ORAL at 13:30

## 2018-05-08 RX ADMIN — PRAVASTATIN SODIUM 1 MG: 40 TABLET ORAL at 16:00

## 2018-05-08 RX ADMIN — IPRATROPIUM BROMIDE AND ALBUTEROL SULFATE 1 AMPULE: .5; 3 SOLUTION RESPIRATORY (INHALATION) at 20:12

## 2018-05-08 RX ADMIN — LEVOFLOXACIN 1 MLS/HR: 5 INJECTION, SOLUTION INTRAVENOUS at 13:38

## 2018-05-08 RX ADMIN — DOCUSATE SODIUM 1 MG: 100 CAPSULE, LIQUID FILLED ORAL at 17:45

## 2018-05-08 RX ADMIN — MORPHINE SULFATE 1 MG: 2 INJECTION, SOLUTION INTRAMUSCULAR; INTRAVENOUS at 11:08

## 2018-05-08 RX ADMIN — BUDESONIDE AND FORMOTEROL FUMARATE DIHYDRATE 1 PUFF: 160; 4.5 AEROSOL RESPIRATORY (INHALATION) at 21:00

## 2018-05-08 RX ADMIN — Medication 1 ML: at 20:42

## 2018-05-09 LAB
ANION GAP: 9 MEQ/L (ref 5–15)
AUTOMATED NEUTROPHIL #: 14.7 TH/MM3 (ref 1.8–7.7)
BASOPHIL #: 0 TH/MM3 (ref 0–0.2)
BASOPHIL %: 0 % (ref 0–2)
BICARBONATE: 27.3 MEQ/L (ref 21–32)
BLOOD UREA NITROGEN: 28 MG/DL (ref 7–18)
CALCIUM: 9.1 MG/DL (ref 8.5–10.1)
CHLORIDE: 103 MEQ/L (ref 98–107)
CREATININE: 1.05 MG/DL (ref 0.5–1)
EOSINOPHIL #: 0 TH/MM3 (ref 0–0.4)
EOSINOPHIL %: 0 % (ref 0–4)
GLOMERULAR FILTRATION RATE: 50 ML/MIN (ref 89–?)
GLUCOSE,RANDOM: 140 MG/DL (ref 74–106)
HEMATOCRIT: 37.2 % (ref 35–46)
HEMO FLAGS: (no result)
HEMOGLOBIN: 12 GM/DL (ref 11.6–15.3)
LYMPH %: 2.8 % (ref 9–44)
LYMPHOCYTE #: 0.4 TH/MM3 (ref 1–4.8)
MEAN CELL VOLUME: 87.7 FL (ref 80–100)
MEAN CORPUSCULAR HEMOGLOBIN: 28.2 PG (ref 27–34)
MEAN CORPUSCULAR HGB CONC: 32.1 % (ref 32–36)
MEAN PLATELET VOLUME: 8.8 FL (ref 7–11)
MONO %: 1.1 % (ref 0–8)
MONOCYTE #: 0.2 TH/MM3 (ref 0–0.9)
NEUT %: 96.1 % (ref 16–70)
PLATELET COUNT: 100 TH/MM3 (ref 150–450)
POTASSIUM: 4.1 MEQ/L (ref 3.5–5.1)
RED BLOOD COUNT: 4.24 MIL/MM3 (ref 4–5.3)
RED CELL DISTRIBUTION WIDTH: 14.5 % (ref 11.6–17.2)
SODIUM (NA): 139 MEQ/L (ref 136–145)
WHITE BLOOD COUNT: 15.3 TH/MM3 (ref 4–11)

## 2018-05-09 RX ADMIN — MAGNESIUM HYDROXIDE 1 ML: 400 SUSPENSION ORAL at 14:03

## 2018-05-09 RX ADMIN — LEVOFLOXACIN 1 MG: 750 TABLET, FILM COATED ORAL at 08:42

## 2018-05-09 RX ADMIN — ASPIRIN 81 MG 1 MG: 81 TABLET ORAL at 08:42

## 2018-05-09 RX ADMIN — IPRATROPIUM BROMIDE AND ALBUTEROL SULFATE 1 AMPULE: .5; 3 SOLUTION RESPIRATORY (INHALATION) at 20:00

## 2018-05-09 RX ADMIN — SODIUM PHOSPHATE, DIBASIC AND SODIUM PHOSPHATE, MONOBASIC 1 ML: 7; 19 ENEMA RECTAL at 16:40

## 2018-05-09 RX ADMIN — Medication 1 ML: at 21:58

## 2018-05-09 RX ADMIN — IPRATROPIUM BROMIDE AND ALBUTEROL SULFATE 1 AMPULE: .5; 3 SOLUTION RESPIRATORY (INHALATION) at 08:15

## 2018-05-09 RX ADMIN — DOCUSATE SODIUM 1 MG: 100 CAPSULE, LIQUID FILLED ORAL at 21:00

## 2018-05-09 RX ADMIN — LOSARTAN POTASSIUM 1 MG: 50 TABLET, FILM COATED ORAL at 08:45

## 2018-05-09 RX ADMIN — BUDESONIDE AND FORMOTEROL FUMARATE DIHYDRATE 1 PUFF: 160; 4.5 AEROSOL RESPIRATORY (INHALATION) at 09:00

## 2018-05-09 RX ADMIN — Medication 1 ML: at 08:46

## 2018-05-09 RX ADMIN — CARVEDILOL 1 MG: 12.5 TABLET, FILM COATED ORAL at 08:42

## 2018-05-09 RX ADMIN — PRAVASTATIN SODIUM 1 MG: 40 TABLET ORAL at 08:45

## 2018-05-09 RX ADMIN — BUDESONIDE AND FORMOTEROL FUMARATE DIHYDRATE 1 PUFF: 160; 4.5 AEROSOL RESPIRATORY (INHALATION) at 21:59

## 2018-05-09 RX ADMIN — IPRATROPIUM BROMIDE AND ALBUTEROL SULFATE 1 AMPULE: .5; 3 SOLUTION RESPIRATORY (INHALATION) at 11:57

## 2018-05-09 RX ADMIN — DOCUSATE SODIUM 1 MG: 100 CAPSULE, LIQUID FILLED ORAL at 08:42

## 2018-05-09 RX ADMIN — HYDROCHLOROTHIAZIDE 1 MG: 12.5 CAPSULE ORAL at 08:41

## 2018-05-09 RX ADMIN — CARVEDILOL 1 MG: 12.5 TABLET, FILM COATED ORAL at 21:58

## 2018-05-10 RX ADMIN — IPRATROPIUM BROMIDE AND ALBUTEROL SULFATE 1 AMPULE: .5; 3 SOLUTION RESPIRATORY (INHALATION) at 20:26

## 2018-05-10 RX ADMIN — CARVEDILOL 1 MG: 12.5 TABLET, FILM COATED ORAL at 20:15

## 2018-05-10 RX ADMIN — BUDESONIDE AND FORMOTEROL FUMARATE DIHYDRATE 1 PUFF: 160; 4.5 AEROSOL RESPIRATORY (INHALATION) at 20:17

## 2018-05-10 RX ADMIN — CARVEDILOL 1 MG: 12.5 TABLET, FILM COATED ORAL at 08:33

## 2018-05-10 RX ADMIN — IPRATROPIUM BROMIDE AND ALBUTEROL SULFATE 1 AMPULE: .5; 3 SOLUTION RESPIRATORY (INHALATION) at 12:14

## 2018-05-10 RX ADMIN — DOCUSATE SODIUM 1 MG: 100 CAPSULE, LIQUID FILLED ORAL at 08:32

## 2018-05-10 RX ADMIN — HYDROCHLOROTHIAZIDE 1 MG: 12.5 CAPSULE ORAL at 08:33

## 2018-05-10 RX ADMIN — Medication 1 ML: at 20:24

## 2018-05-10 RX ADMIN — BUDESONIDE AND FORMOTEROL FUMARATE DIHYDRATE 1 PUFF: 160; 4.5 AEROSOL RESPIRATORY (INHALATION) at 08:33

## 2018-05-10 RX ADMIN — ACETAMINOPHEN 1 MLS/HR: 10 INJECTION, SOLUTION INTRAVENOUS at 21:56

## 2018-05-10 RX ADMIN — ACETAMINOPHEN 1 MLS/HR: 10 INJECTION, SOLUTION INTRAVENOUS at 14:55

## 2018-05-10 RX ADMIN — DOCUSATE SODIUM 1 MG: 100 CAPSULE, LIQUID FILLED ORAL at 20:15

## 2018-05-10 RX ADMIN — LOSARTAN POTASSIUM 1 MG: 50 TABLET, FILM COATED ORAL at 08:32

## 2018-05-10 RX ADMIN — IPRATROPIUM BROMIDE AND ALBUTEROL SULFATE 1 AMPULE: .5; 3 SOLUTION RESPIRATORY (INHALATION) at 08:00

## 2018-05-10 RX ADMIN — ASPIRIN 81 MG 1 MG: 81 TABLET ORAL at 08:32

## 2018-05-11 RX ADMIN — ACETAMINOPHEN 1 MLS/HR: 10 INJECTION, SOLUTION INTRAVENOUS at 14:00

## 2018-05-11 RX ADMIN — CARVEDILOL 1 MG: 12.5 TABLET, FILM COATED ORAL at 21:20

## 2018-05-11 RX ADMIN — ACETAMINOPHEN 1 MLS/HR: 10 INJECTION, SOLUTION INTRAVENOUS at 21:30

## 2018-05-11 RX ADMIN — ASPIRIN 81 MG 1 MG: 81 TABLET ORAL at 09:22

## 2018-05-11 RX ADMIN — HYDROCHLOROTHIAZIDE 1 MG: 12.5 CAPSULE ORAL at 09:21

## 2018-05-11 RX ADMIN — LOSARTAN POTASSIUM 1 MG: 50 TABLET, FILM COATED ORAL at 09:22

## 2018-05-11 RX ADMIN — IPRATROPIUM BROMIDE AND ALBUTEROL SULFATE 1 AMPULE: .5; 3 SOLUTION RESPIRATORY (INHALATION) at 20:00

## 2018-05-11 RX ADMIN — CARVEDILOL 1 MG: 12.5 TABLET, FILM COATED ORAL at 09:21

## 2018-05-11 RX ADMIN — Medication 1 ML: at 21:27

## 2018-05-11 RX ADMIN — BUDESONIDE AND FORMOTEROL FUMARATE DIHYDRATE 1 PUFF: 160; 4.5 AEROSOL RESPIRATORY (INHALATION) at 21:27

## 2018-05-11 RX ADMIN — BUDESONIDE AND FORMOTEROL FUMARATE DIHYDRATE 1 PUFF: 160; 4.5 AEROSOL RESPIRATORY (INHALATION) at 09:26

## 2018-05-11 RX ADMIN — DOCUSATE SODIUM 1 MG: 100 CAPSULE, LIQUID FILLED ORAL at 09:21

## 2018-05-11 RX ADMIN — IPRATROPIUM BROMIDE AND ALBUTEROL SULFATE 1 AMPULE: .5; 3 SOLUTION RESPIRATORY (INHALATION) at 12:27

## 2018-05-11 RX ADMIN — DOCUSATE SODIUM 1 MG: 100 CAPSULE, LIQUID FILLED ORAL at 21:26

## 2018-05-11 RX ADMIN — ACETAMINOPHEN 1 MLS/HR: 10 INJECTION, SOLUTION INTRAVENOUS at 04:59

## 2018-05-11 RX ADMIN — IPRATROPIUM BROMIDE AND ALBUTEROL SULFATE 1 AMPULE: .5; 3 SOLUTION RESPIRATORY (INHALATION) at 09:20

## 2018-05-11 RX ADMIN — Medication 1 ML: at 09:24

## 2018-05-11 RX ADMIN — PRAVASTATIN SODIUM 1 MG: 40 TABLET ORAL at 09:23

## 2018-05-12 RX ADMIN — DOCUSATE SODIUM 1 MG: 100 CAPSULE, LIQUID FILLED ORAL at 08:08

## 2018-05-12 RX ADMIN — HYDROCHLOROTHIAZIDE 1 MG: 12.5 CAPSULE ORAL at 08:08

## 2018-05-12 RX ADMIN — PRAVASTATIN SODIUM 1 MG: 40 TABLET ORAL at 08:07

## 2018-05-12 RX ADMIN — ACETAMINOPHEN 1 MLS/HR: 10 INJECTION, SOLUTION INTRAVENOUS at 12:54

## 2018-05-12 RX ADMIN — BUDESONIDE AND FORMOTEROL FUMARATE DIHYDRATE 1 PUFF: 160; 4.5 AEROSOL RESPIRATORY (INHALATION) at 08:09

## 2018-05-12 RX ADMIN — IPRATROPIUM BROMIDE AND ALBUTEROL SULFATE 1 AMPULE: .5; 3 SOLUTION RESPIRATORY (INHALATION) at 09:22

## 2018-05-12 RX ADMIN — ASPIRIN 81 MG 1 MG: 81 TABLET ORAL at 08:08

## 2018-05-12 RX ADMIN — LOSARTAN POTASSIUM 1 MG: 50 TABLET, FILM COATED ORAL at 08:08

## 2018-05-12 RX ADMIN — ACETAMINOPHEN 1 MG: 325 TABLET ORAL at 13:10

## 2018-05-12 RX ADMIN — PRAVASTATIN SODIUM 1 MG: 40 TABLET ORAL at 08:14

## 2018-05-12 RX ADMIN — CARVEDILOL 1 MG: 12.5 TABLET, FILM COATED ORAL at 08:09

## 2018-05-12 RX ADMIN — Medication 1 ML: at 08:10

## 2018-05-12 RX ADMIN — ACETAMINOPHEN 1 MLS/HR: 10 INJECTION, SOLUTION INTRAVENOUS at 06:00

## 2018-05-30 ENCOUNTER — HOSPITAL ENCOUNTER (OUTPATIENT)
Dept: HOSPITAL 17 - NEPC | Age: 83
Setting detail: OBSERVATION
LOS: 2 days | Discharge: PSYCHIATRIC OR SUBSTANCE USE FACILITY | End: 2018-06-01
Attending: HOSPITALIST | Admitting: HOSPITALIST
Payer: MEDICARE

## 2018-05-30 VITALS
SYSTOLIC BLOOD PRESSURE: 126 MMHG | RESPIRATION RATE: 17 BRPM | HEART RATE: 81 BPM | OXYGEN SATURATION: 96 % | TEMPERATURE: 97.6 F | DIASTOLIC BLOOD PRESSURE: 72 MMHG

## 2018-05-30 VITALS
OXYGEN SATURATION: 95 % | RESPIRATION RATE: 17 BRPM | SYSTOLIC BLOOD PRESSURE: 133 MMHG | HEART RATE: 87 BPM | DIASTOLIC BLOOD PRESSURE: 73 MMHG

## 2018-05-30 VITALS
HEART RATE: 77 BPM | OXYGEN SATURATION: 97 % | RESPIRATION RATE: 15 BRPM | DIASTOLIC BLOOD PRESSURE: 63 MMHG | SYSTOLIC BLOOD PRESSURE: 148 MMHG

## 2018-05-30 VITALS
HEART RATE: 84 BPM | RESPIRATION RATE: 16 BRPM | OXYGEN SATURATION: 95 % | SYSTOLIC BLOOD PRESSURE: 136 MMHG | TEMPERATURE: 98.8 F | DIASTOLIC BLOOD PRESSURE: 74 MMHG

## 2018-05-30 VITALS — HEIGHT: 56 IN | BODY MASS INDEX: 29.76 KG/M2 | WEIGHT: 132.28 LBS

## 2018-05-30 DIAGNOSIS — N39.0: ICD-10-CM

## 2018-05-30 DIAGNOSIS — R41.0: Primary | ICD-10-CM

## 2018-05-30 DIAGNOSIS — Z79.899: ICD-10-CM

## 2018-05-30 DIAGNOSIS — M48.54XA: ICD-10-CM

## 2018-05-30 DIAGNOSIS — I25.10: ICD-10-CM
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
## 2018-05-30 DIAGNOSIS — Z86.19: ICD-10-CM

## 2018-05-30 DIAGNOSIS — I50.9: ICD-10-CM

## 2018-05-30 DIAGNOSIS — F22: ICD-10-CM

## 2018-05-30 DIAGNOSIS — E78.5: ICD-10-CM

## 2018-05-30 DIAGNOSIS — K21.9: ICD-10-CM

## 2018-05-30 DIAGNOSIS — M19.042: ICD-10-CM

## 2018-05-30 DIAGNOSIS — J98.11: ICD-10-CM

## 2018-05-30 DIAGNOSIS — Z87.891: ICD-10-CM

## 2018-05-30 DIAGNOSIS — I11.0: ICD-10-CM

## 2018-05-30 DIAGNOSIS — Z79.82: ICD-10-CM

## 2018-05-30 DIAGNOSIS — M06.4: ICD-10-CM

## 2018-05-30 DIAGNOSIS — I25.2: ICD-10-CM

## 2018-05-30 DIAGNOSIS — F41.9: ICD-10-CM

## 2018-05-30 DIAGNOSIS — R32: ICD-10-CM

## 2018-05-30 DIAGNOSIS — I42.9: ICD-10-CM

## 2018-05-30 DIAGNOSIS — R41.840: ICD-10-CM

## 2018-05-30 DIAGNOSIS — M19.041: ICD-10-CM

## 2018-05-30 DIAGNOSIS — J44.9: ICD-10-CM

## 2018-05-30 DIAGNOSIS — Z95.5: ICD-10-CM

## 2018-05-30 LAB
ALBUMIN SERPL-MCNC: 3 GM/DL (ref 3.4–5)
ALP SERPL-CCNC: 98 U/L (ref 45–117)
ALT SERPL-CCNC: 41 U/L (ref 10–53)
AST SERPL-CCNC: 41 U/L (ref 15–37)
BACTERIA #/AREA URNS HPF: (no result) /HPF
BASOPHILS # BLD AUTO: 0.1 TH/MM3 (ref 0–0.2)
BASOPHILS NFR BLD: 0.9 % (ref 0–2)
BILIRUB SERPL-MCNC: 0.5 MG/DL (ref 0.2–1)
BUN SERPL-MCNC: 35 MG/DL (ref 7–18)
CALCIUM SERPL-MCNC: 9.6 MG/DL (ref 8.5–10.1)
CHLORIDE SERPL-SCNC: 101 MEQ/L (ref 98–107)
COLOR UR: YELLOW
CREAT SERPL-MCNC: 1.24 MG/DL (ref 0.5–1)
EOSINOPHIL # BLD: 0.1 TH/MM3 (ref 0–0.4)
EOSINOPHIL NFR BLD: 1.2 % (ref 0–4)
ERYTHROCYTE [DISTWIDTH] IN BLOOD BY AUTOMATED COUNT: 15.2 % (ref 11.6–17.2)
GFR SERPLBLD BASED ON 1.73 SQ M-ARVRAT: 41 ML/MIN (ref 89–?)
GLUCOSE SERPL-MCNC: 106 MG/DL (ref 74–106)
GLUCOSE UR STRIP-MCNC: (no result) MG/DL
HCO3 BLD-SCNC: 26.9 MEQ/L (ref 21–32)
HCT VFR BLD CALC: 36.4 % (ref 35–46)
HGB BLD-MCNC: 12 GM/DL (ref 11.6–15.3)
HGB UR QL STRIP: (no result)
KETONES UR STRIP-MCNC: (no result) MG/DL
LYMPHOCYTES # BLD AUTO: 0.8 TH/MM3 (ref 1–4.8)
LYMPHOCYTES NFR BLD AUTO: 6.7 % (ref 9–44)
MCH RBC QN AUTO: 28.7 PG (ref 27–34)
MCHC RBC AUTO-ENTMCNC: 32.9 % (ref 32–36)
MCV RBC AUTO: 87.2 FL (ref 80–100)
MONOCYTE #: 1.4 TH/MM3 (ref 0–0.9)
MONOCYTES NFR BLD: 11.4 % (ref 0–8)
NEUTROPHILS # BLD AUTO: 9.5 TH/MM3 (ref 1.8–7.7)
NEUTROPHILS NFR BLD AUTO: 79.8 % (ref 16–70)
NITRITE UR QL STRIP: (no result)
PLATELET # BLD: 151 TH/MM3 (ref 150–450)
PMV BLD AUTO: 8.5 FL (ref 7–11)
PROT SERPL-MCNC: 7.3 GM/DL (ref 6.4–8.2)
RBC # BLD AUTO: 4.18 MIL/MM3 (ref 4–5.3)
SODIUM SERPL-SCNC: 138 MEQ/L (ref 136–145)
SP GR UR STRIP: 1.02 (ref 1–1.03)
SQUAMOUS #/AREA URNS HPF: 5 /HPF (ref 0–5)
URINE LEUKOCYTE ESTERASE: (no result)
WBC # BLD AUTO: 11.9 TH/MM3 (ref 4–11)

## 2018-05-30 PROCEDURE — 80053 COMPREHEN METABOLIC PANEL: CPT

## 2018-05-30 PROCEDURE — 97530 THERAPEUTIC ACTIVITIES: CPT

## 2018-05-30 PROCEDURE — 87086 URINE CULTURE/COLONY COUNT: CPT

## 2018-05-30 PROCEDURE — 96361 HYDRATE IV INFUSION ADD-ON: CPT

## 2018-05-30 PROCEDURE — 97110 THERAPEUTIC EXERCISES: CPT

## 2018-05-30 PROCEDURE — G0378 HOSPITAL OBSERVATION PER HR: HCPCS

## 2018-05-30 PROCEDURE — 86592 SYPHILIS TEST NON-TREP QUAL: CPT

## 2018-05-30 PROCEDURE — 84439 ASSAY OF FREE THYROXINE: CPT

## 2018-05-30 PROCEDURE — 85025 COMPLETE CBC W/AUTO DIFF WBC: CPT

## 2018-05-30 PROCEDURE — 71045 X-RAY EXAM CHEST 1 VIEW: CPT

## 2018-05-30 PROCEDURE — 94664 DEMO&/EVAL PT USE INHALER: CPT

## 2018-05-30 PROCEDURE — 82607 VITAMIN B-12: CPT

## 2018-05-30 PROCEDURE — 99285 EMERGENCY DEPT VISIT HI MDM: CPT

## 2018-05-30 PROCEDURE — 82140 ASSAY OF AMMONIA: CPT

## 2018-05-30 PROCEDURE — 81001 URINALYSIS AUTO W/SCOPE: CPT

## 2018-05-30 PROCEDURE — 80048 BASIC METABOLIC PNL TOTAL CA: CPT

## 2018-05-30 PROCEDURE — 70450 CT HEAD/BRAIN W/O DYE: CPT

## 2018-05-30 PROCEDURE — 84425 ASSAY OF VITAMIN B-1: CPT

## 2018-05-30 PROCEDURE — 82746 ASSAY OF FOLIC ACID SERUM: CPT

## 2018-05-30 PROCEDURE — 97162 PT EVAL MOD COMPLEX 30 MIN: CPT

## 2018-05-30 PROCEDURE — 96365 THER/PROPH/DIAG IV INF INIT: CPT

## 2018-05-30 PROCEDURE — 84443 ASSAY THYROID STIM HORMONE: CPT

## 2018-05-30 RX ADMIN — CARVEDILOL SCH MG: 12.5 TABLET, FILM COATED ORAL at 20:24

## 2018-05-30 RX ADMIN — PHENYTOIN SODIUM SCH MLS/HR: 50 INJECTION INTRAMUSCULAR; INTRAVENOUS at 20:24

## 2018-05-30 NOTE — RADRPT
EXAM DATE:  5/30/2018 4:38 PM EDT

AGE/SEX:        87 years / Female



INDICATIONS:  Altered mental status.



CLINICAL DATA:  This is the patient's initial encounter. Patient reports that signs and symptoms have
 been present for 1 day and indicates a pain score of 2/10. 

                                                                          

MEDICAL/SURGICAL HISTORY:   Cardiovascular disease.  Chronic obstructive pulmonary disease. Pacemaker
.



RADIATION DOSE:  56.35 CTDI (mGy)



COMPARISON:      [facility], CT BRAIN W/O CONTRAST, 10/5/2013.  .



TECHNIQUE:  CT of the head without contrast.  Using automated exposure control and adjustment of the 
mA and/or kV according to patient size, radiation dose was kept as low as reasonably achievable to ob
tain optimal diagnostic quality images.



FINDINGS: 

Cerebrum:  The ventricles are normal for age with mild atrophy and chronic small vessel ischemic brown
ges again noted. No evidence of midline shift, mass lesion, hemorrhage or acute infarction.  No extra
axial fluid collections are seen.

Posterior Fossa:  The cerebellum and brainstem are intact.  The 4th ventricle is midline.  The cerebe
llopontine angle is unremarkable.

Extracranial:  The visualized portion of the orbits is intact.

Skull:  The calvaria is intact.  No evidence of skull fracture.



CONCLUSION: Negative acute study with no evidence of hemorrhage or infarction.



Electronically signed by: Igor Haines MD  5/30/2018 4:42 PM EDT

## 2018-05-30 NOTE — PD
HPI


Chief Complaint:  Altered Mental Status


Time Seen by Provider:  13:55


Travel History


International Travel<30 days:  No


Contact w/Intl Traveler<30days:  No


Traveled to known affect area:  No





History of Present Illness


HPI


Patient is an 87-year-old female with history of COPD, hypertension, presents 

the emergency room for evaluation of altered mental status.  As per patient's 

daughter, patient was at home with family members.  Reports that patient she 

began to become paranoid last night. Reports that she is convinced that her 

nephew's wife whom she lives with is trying to kill her. Reports that she 

called the  last night around 3am and had them follow her while she drove 

to her daughter's home in [place].  Reports that patient has never acted 

like this in the past. Patient with no psych history.  Patient is alert to 

person and place not time.





PFSH


Past Medical History


Hx Anticoagulant Therapy:  Yes (asa 81mg)


Arthritis:  Yes (Fingers)


Asthma:  No


Autoimmune Disease:  No


Blood Disorders:  No


Anxiety:  Yes


Depression:  No


Heart Rhythm Problems:  No


Cancer:  No


Cardiac Catheterization:  Yes (MI, angioplasty, stents)


Cardiovascular Problems:  Yes (STENTS X 3)


High Cholesterol:  Yes


Chemotherapy:  No


Chest Pain:  Yes


Congestive Heart Failure:  Yes


COPD:  Yes


Cerebrovascular Accident:  No


Coronary Artery Disease:  Yes


Diabetes:  No


Diminished Hearing:  No


Endocrine:  No


Gastrointestinal Disorders:  Yes (HX c-diff, peritonitis)


GERD:  Yes


Genitourinary:  Yes (Frequency)


Headaches:  Yes


Hepatitis:  No


Hiatal Hernia:  Yes


Hypertension:  Yes


Immune Disorder:  No


Kidney Stones:  No


Medical other:  Yes (SOME TYPE OF CONNECTIVE TISSUE DISORDER)


Musculoskeletal:  Yes (Tendonitis (R) shoulder, DDD low back)


Neurologic:  No


Psychiatric:  Yes


Reproductive:  No


Respiratory:  Yes (COPD)


Integumentary:  Yes (CELLULITIS LEFT FOOT/CONNECTIVE TISSUE DISORDER)


Immunizations Current:  Yes


Migraines:  No


Myocardial Infarction:  Yes


Pneumonia:  Yes (H/O)


Radiation Therapy:  No


Renal Failure:  No


Seizures:  Yes


Shingles:  Yes


Sleep Apnea:  No


Thyroid Disease:  No


Ulcer:  No


PNEUMOCCOCAL Vaccine (Year):  1


Pregnant?:  Not Pregnant


Menopausal:  Yes





Past Surgical History


Abdominal Surgery:  Yes ( REMOVED CYST IN ABDOMEN,APPENDECTOMY)


AICD:  Yes


Appendectomy:  Yes


Arteriovenous Shunt:  Yes


Body Medical Devices:  Cardiac stents


Cardiac Surgery:  Yes (PM and DF)


Coronary Stent:  Yes


Ear Surgery:  No


Endocrine Surgery:  No


Eye Surgery:  Yes (BL cataracts)


Genitourinary Surgery:  No


Gynecologic Surgery:  No


Insulin Pump:  No


Joint Replacement:  No


Neurologic Surgery:  No


Oral Surgery:  No


Pacemaker:  Yes


Thoracic Surgery:  No


Other Surgery:  Yes ( SBO, eye implants)





Family History


Family Hypercholesterolemia:  Yes





Social History


Alcohol Use:  Yes (Occ.)


Tobacco Use:  No (Quit)


Substance Use:  No





Allergies-Medications


(Allergen,Severity, Reaction):  


Coded Allergies:  


     Sulfa (Sulfonamide Antibiotics) (Unverified  Allergy, Severe, Rash, 5/8/18)


     cilostazol (Unverified  Allergy, Severe, Itching, 5/8/18)


     niacin (Unverified  Allergy, Severe, Rash, 5/8/18)


     sulfisoxazole (Unverified  Allergy, Severe, Rash, 5/8/18)


Reported Meds & Prescriptions





Reported Meds & Active Scripts


Active


Reported


Promethazine-Codeine Liq 6.25-10 Mg/5 Ml Syrp 10 Ml PO BID PRN


Losartan (Losartan Potassium) 50 Mg Tab 50 Mg PO DAILY


Hydrochlorothiazide 25 Mg Tab 25 Mg PO DAILY


Albuterol Neb (Albuterol Sulfate) 2.5 Mg/0.5 Ml Neb 2.5 Mg NEB DAILY


     Note: The Albuterol Sulfate Inhalation Solution is concentrated and


     must be diluted. Read complete instructions carefully before using.


Tramadol (Tramadol HCl) 50 Mg Tab 50 Mg PO Q4H PRN


Vitamin B-12 (Cyanocobalamin) 1,000 Mcg Subl 1,000 Mcg SL DAILY


Ventolin Hfa 18 GM Inh (Albuterol Sulfate) 90 Mcg/Act Aer 2 Puff INH Q6H PRN


Temazepam 15 Mg Cap 15 Mg PO HS PRN


Multiple Vitamin 1 Tab 1 Tab PO DAILY


Lovastatin 40 Mg Tab 80 Mg PO DAILY


[Ipratropium Bromide]     


Carvedilol 12.5 Mg Tab 12.5 Mg PO BID


Aspirin Children's (Aspirin) 81 Mg Chew 81 Mg CHEW DAILY


Alprazolam 0.5 Mg Tab 0.5 Mg PO BID PRN








Review of Systems


ROS Limitations:  Altered Mental Status


General / Constitutional:  No: Fever


Eyes:  No: Visual changes


HENT:  No: Headaches


Cardiovascular:  No: Chest Pain or Discomfort


Respiratory:  No: Shortness of Breath


Gastrointestinal:  No: Abdominal Pain


Genitourinary:  No: Dysuria


Musculoskeletal:  No: Pain


Skin:  No Rash


Neurologic:  No: Weakness


Psychiatric:  No: Depression


Endocrine:  No: Polydipsia


Hematologic/Lymphatic:  No: Easy Bruising





Physical Exam


Narrative


GENERAL: NAD, patient laughing and smiling on evaluation


SKIN: Focused skin assessment warm/dry.


HEAD: Atraumatic. Normocephalic. 


EYES: Pupils equal and round. No scleral icterus. No injection or drainage. 


ENT: No nasal bleeding or discharge.  Mucous membranes pink and moist.


NECK: Trachea midline. No JVD. 


CARDIOVASCULAR: Regular rate and rhythm.  No murmur appreciated.


RESPIRATORY: No accessory muscle use. Clear to auscultation. Breath sounds 

equal bilaterally. 


GASTROINTESTINAL: Abdomen soft, non-tender, nondistended. Hepatic and splenic 

margins not palpable. 


MUSCULOSKELETAL: No obvious deformities. No clubbing.  No cyanosis.  No edema. 


NEUROLOGICAL: Awake and alert. No obvious cranial nerve deficits.  Motor 

grossly within normal limits. Normal speech.


PSYCHIATRIC: Appropriate mood and affect; insight and judgment normal.





Data


Data


Last Documented VS





Vital Signs








  Date Time  Temp Pulse Resp B/P (MAP) Pulse Ox O2 Delivery O2 Flow Rate FiO2


 


5/30/18 14:05  87 17 133/73 (93) 95 Room Air  


 


5/30/18 13:43 97.6       








Orders





 Orders


Complete Blood Count With Diff (5/30/18 14:05)


Comprehensive Metabolic Panel (5/30/18 14:05)


Urinalysis - C+S If Indicated (5/30/18 14:05)


Ecg Monitoring (5/30/18 14:05)


Iv Access Insert/Monitor (5/30/18 14:05)


Cath For Specimen (5/30/18 14:05)


Oximetry (5/30/18 14:05)


Sodium Chloride 0.9% Flush (Ns Flush) (5/30/18 14:15)


Urine Culture (5/30/18 14:19)


Ct Brain W/O Iv Contrast(Rout) (5/30/18 15:03)


Ceftriaxone Inj (Rocephin Inj) (5/30/18 15:15)


Acetaminophen (Tylenol) (5/30/18 17:00)





Labs





Laboratory Tests








Test


  5/30/18


14:19


 


White Blood Count 11.9 TH/MM3 


 


Red Blood Count 4.18 MIL/MM3 


 


Hemoglobin 12.0 GM/DL 


 


Hematocrit 36.4 % 


 


Mean Corpuscular Volume 87.2 FL 


 


Mean Corpuscular Hemoglobin 28.7 PG 


 


Mean Corpuscular Hemoglobin


Concent 32.9 % 


 


 


Red Cell Distribution Width 15.2 % 


 


Platelet Count 151 TH/MM3 


 


Mean Platelet Volume 8.5 FL 


 


Neutrophils (%) (Auto) 79.8 % 


 


Lymphocytes (%) (Auto) 6.7 % 


 


Monocytes (%) (Auto) 11.4 % 


 


Eosinophils (%) (Auto) 1.2 % 


 


Basophils (%) (Auto) 0.9 % 


 


Neutrophils # (Auto) 9.5 TH/MM3 


 


Lymphocytes # (Auto) 0.8 TH/MM3 


 


Monocytes # (Auto) 1.4 TH/MM3 


 


Eosinophils # (Auto) 0.1 TH/MM3 


 


Basophils # (Auto) 0.1 TH/MM3 


 


CBC Comment DIFF FINAL 


 


Differential Comment  


 


Urine Color YELLOW 


 


Urine Turbidity HAZY 


 


Urine pH 5.5 


 


Urine Specific Gravity 1.020 


 


Urine Protein TRACE mg/dL 


 


Urine Glucose (UA) NEG mg/dL 


 


Urine Ketones NEG mg/dL 


 


Urine Occult Blood NEG 


 


Urine Nitrite NEG 


 


Urine Bilirubin NEG 


 


Urine Urobilinogen


  LESS THAN 2.0


MG/DL


 


Urine Leukocyte Esterase TRACE 


 


Urine RBC 1 /hpf 


 


Urine WBC 1 /hpf 


 


Urine Squamous Epithelial


Cells 5 /hpf 


 


 


Urine Bacteria RARE /hpf 


 


Microscopic Urinalysis Comment


  CATH-CULTURE


IND


 


Blood Urea Nitrogen 35 MG/DL 


 


Creatinine 1.24 MG/DL 


 


Random Glucose 106 MG/DL 


 


Total Protein 7.3 GM/DL 


 


Albumin 3.0 GM/DL 


 


Calcium Level 9.6 MG/DL 


 


Alkaline Phosphatase 98 U/L 


 


Aspartate Amino Transf


(AST/SGOT) 41 U/L 


 


 


Alanine Aminotransferase


(ALT/SGPT) 41 U/L 


 


 


Total Bilirubin 0.5 MG/DL 


 


Sodium Level 138 MEQ/L 


 


Potassium Level 3.5 MEQ/L 


 


Chloride Level 101 MEQ/L 


 


Carbon Dioxide Level 26.9 MEQ/L 


 


Anion Gap 10 MEQ/L 


 


Estimat Glomerular Filtration


Rate 41 ML/MIN 


 











MDM


Medical Decision Making


Medical Screen Exam Complete:  Yes


Emergency Medical Condition:  Yes


Medical Record Reviewed:  Yes


Interpretation(s)





Vital Signs








  Date Time  Temp Pulse Resp B/P (MAP) Pulse Ox O2 Delivery O2 Flow Rate FiO2


 


5/30/18 14:05  87 17 133/73 (93) 95 Room Air  


 


5/30/18 13:43 97.6 81 17 126/72 (90) 96   








Differential Diagnosis


uti, electrolyte abnormality, ich, cva


Narrative Course


Patient is a 87 year old female who presents to the ER with complaints of 

altered mental status as per her daughter.  Reports concern as patient has a 

flight of ideas, reports that she is erratic and has thoughts that her nephew's 

wife is trying to poison her.  Reports that this has never happened in the 

past. 





During the course of the patients emergency department visit, the patients 

history, examination, and differential diagnosis were reviewed with the 

patient. The patient was placed on a cardiac monitor with oximetry and frequent 

blood pressure monitoring. The patient had an IV access obtained and blood work 

sent for analysis. 





The patient was initially provided IV Rocephin for UTI. 





The patients laboratory studies were reviewed and remarkable for 








Laboratory Tests








Test


  5/30/18


14:19


 


White Blood Count


  11.9 TH/MM3


(4.0-11.0)


 


Red Blood Count


  4.18 MIL/MM3


(4.00-5.30)


 


Hemoglobin


  12.0 GM/DL


(11.6-15.3)


 


Hematocrit


  36.4 %


(35.0-46.0)


 


Mean Corpuscular Volume


  87.2 FL


(80.0-100.0)


 


Mean Corpuscular Hemoglobin


  28.7 PG


(27.0-34.0)


 


Mean Corpuscular Hemoglobin


Concent 32.9 %


(32.0-36.0)


 


Red Cell Distribution Width


  15.2 %


(11.6-17.2)


 


Platelet Count


  151 TH/MM3


(150-450)


 


Mean Platelet Volume


  8.5 FL


(7.0-11.0)


 


Neutrophils (%) (Auto)


  79.8 %


(16.0-70.0)


 


Lymphocytes (%) (Auto)


  6.7 %


(9.0-44.0)


 


Monocytes (%) (Auto)


  11.4 %


(0.0-8.0)


 


Eosinophils (%) (Auto)


  1.2 %


(0.0-4.0)


 


Basophils (%) (Auto)


  0.9 %


(0.0-2.0)


 


Neutrophils # (Auto)


  9.5 TH/MM3


(1.8-7.7)


 


Lymphocytes # (Auto)


  0.8 TH/MM3


(1.0-4.8)


 


Monocytes # (Auto)


  1.4 TH/MM3


(0-0.9)


 


Eosinophils # (Auto)


  0.1 TH/MM3


(0-0.4)


 


Basophils # (Auto)


  0.1 TH/MM3


(0-0.2)


 


CBC Comment DIFF FINAL 


 


Differential Comment  


 


Urine Color


  YELLOW


(YELLW/STRAW)


 


Urine Turbidity HAZY (CLEAR) 


 


Urine pH 5.5 (5.0-8.5) 


 


Urine Specific Gravity


  1.020


(1.002-1.035)


 


Urine Protein


  TRACE mg/dL


(NEG-TRACE)


 


Urine Glucose (UA)


  NEG mg/dL


(NEG)


 


Urine Ketones


  NEG mg/dL


(NEG)


 


Urine Occult Blood NEG (NEG) 


 


Urine Nitrite NEG (NEG) 


 


Urine Bilirubin NEG (NEG) 


 


Urine Urobilinogen


  LESS THAN 2.0


MG/DL (LESS


 


Urine Leukocyte Esterase TRACE (NEG) 


 


Urine RBC 1 /hpf (0-3) 


 


Urine WBC 1 /hpf (0-5) 


 


Urine Squamous Epithelial


Cells 5 /hpf (0-5) 


 


 


Urine Bacteria


  RARE /hpf


(NONE)


 


Microscopic Urinalysis Comment


  CATH-CULTURE


IND


 


Blood Urea Nitrogen


  35 MG/DL


(7-18)


 


Creatinine


  1.24 MG/DL


(0.50-1.00)


 


Random Glucose


  106 MG/DL


()


 


Total Protein


  7.3 GM/DL


(6.4-8.2)


 


Albumin


  3.0 GM/DL


(3.4-5.0)


 


Calcium Level


  9.6 MG/DL


(8.5-10.1)


 


Alkaline Phosphatase


  98 U/L


()


 


Aspartate Amino Transf


(AST/SGOT) 41 U/L (15-37) 


 


 


Alanine Aminotransferase


(ALT/SGPT) 41 U/L (10-53) 


 


 


Total Bilirubin


  0.5 MG/DL


(0.2-1.0)


 


Sodium Level


  138 MEQ/L


(136-145)


 


Potassium Level


  3.5 MEQ/L


(3.5-5.1)


 


Chloride Level


  101 MEQ/L


()


 


Carbon Dioxide Level


  26.9 MEQ/L


(21.0-32.0)


 


Anion Gap


  10 MEQ/L


(5-15)


 


Estimat Glomerular Filtration


Rate 41 ML/MIN


(>89)











Radiology studies were reviewed and remarkable for 








Last Impressions








Head CT 5/30/18 1503 Signed





Impressions: 





 CONCLUSION: Negative acute study with no evidence of hemorrhage or infarction.





  





 





Patient reevaluated, patient with acute delirium.  patient is unsafe to be 

discharged to home at this time and will require observation. patient's 

daughter reports that patient has roommates and not caregivers, she is unable 

to care for herself at this time. acute delirium most likely from uti





case reviewed with Dr. Hernandez who accepts pt to service





Diagnosis





 Primary Impression:  


 Acute delirium


 Additional Impression:  


 UTI (urinary tract infection)





Admitting Information


Admitting Physician Requests:  Observation











Sulma Shields DO May 30, 2018 14:38

## 2018-05-30 NOTE — HHI.HP
HPI


Service


[facility]


Primary Care Physician


Sedrick Das MD


Admission Diagnosis





Acute delirium, uti


Travel History


International Travel<30 Days:  No


Contact w/Intl Traveler <30 Da:  No


Traveled to Known Affected Are:  No





Past Family Social History


Past Medical History


1) hypertension


2) COPD


3) coronary artery disease, status post coronary stents


4) ventricular tachyarrythmia. ablation. defibrillator


5) hyperlipidemia


6) inflammatory polyarthropathy


7) history of Clostridium difficile colitis


8) T9 compression fx


Past Surgical History


1) cataract surgery


2) appendectomy


3) kyphoplasty at T7


4) multiple abdominal surgeries for small bowel obstruction


Reported Medications





Promethazine-Codeine Liq 6.25-10 Mg/5 Ml Syrp 10 Ml PO BID PRN


Losartan (Losartan Potassium) 50 Mg Tab 50 Mg PO DAILY


Hydrochlorothiazide 25 Mg Tab 25 Mg PO DAILY


Albuterol Neb (Albuterol Sulfate) 2.5 Mg/0.5 Ml Neb 2.5 Mg NEB DAILY


     Note: The Albuterol Sulfate Inhalation Solution is concentrated and


     must be diluted. Read complete instructions carefully before using.


Tramadol (Tramadol HCl) 50 Mg Tab 50 Mg PO Q4H PRN


Vitamin B-12 (Cyanocobalamin) 1,000 Mcg Subl 1,000 Mcg SL DAILY


Ventolin Hfa 18 GM Inh (Albuterol Sulfate) 90 Mcg/Act Aer 2 Puff INH Q6H PRN


Temazepam 15 Mg Cap 15 Mg PO HS PRN


Multiple Vitamin 1 Tab 1 Tab PO DAILY


Lovastatin 40 Mg Tab 80 Mg PO DAILY


[Ipratropium Bromide]     


Carvedilol 12.5 Mg Tab 12.5 Mg PO BID


Aspirin Children's (Aspirin) 81 Mg Chew 81 Mg CHEW DAILY


Alprazolam 0.5 Mg Tab 0.5 Mg PO BID PRN


Allergies:  


Coded Allergies:  


     Sulfa (Sulfonamide Antibiotics) (Unverified  Allergy, Severe, Rash, 18)


     cilostazol (Unverified  Allergy, Severe, Itching, 18)


     niacin (Unverified  Allergy, Severe, Rash, 18)


     sulfisoxazole (Unverified  Allergy, Severe, Rash, 18)


Family History


nc


Social History


occasional etoh


former smoker





Physical Exam


Vital Signs


seems agitated and a little confused


heart reg


lung cta


abd s/nt


ext no edema


Vital Signs








  Date Time  Temp Pulse Resp B/P (MAP) Pulse Ox O2 Delivery O2 Flow Rate FiO2


 


18 18:16   16     


 


18 17:38  77 15 148/63 (91) 97 Nasal Cannula 2.00 


 


18 14:05  87 17 133/73 (93) 95 Room Air  


 


18 13:43 97.6 81 17 126/72 (90) 96   








Laboratory





Laboratory Tests








Test


  18


14:19


 


White Blood Count 11.9 


 


Red Blood Count 4.18 


 


Hemoglobin 12.0 


 


Hematocrit 36.4 


 


Mean Corpuscular Volume 87.2 


 


Mean Corpuscular Hemoglobin 28.7 


 


Mean Corpuscular Hemoglobin


Concent 32.9 


 


 


Red Cell Distribution Width 15.2 


 


Platelet Count 151 


 


Mean Platelet Volume 8.5 


 


Neutrophils (%) (Auto) 79.8 


 


Lymphocytes (%) (Auto) 6.7 


 


Monocytes (%) (Auto) 11.4 


 


Eosinophils (%) (Auto) 1.2 


 


Basophils (%) (Auto) 0.9 


 


Neutrophils # (Auto) 9.5 


 


Lymphocytes # (Auto) 0.8 


 


Monocytes # (Auto) 1.4 


 


Eosinophils # (Auto) 0.1 


 


Basophils # (Auto) 0.1 


 


CBC Comment DIFF FINAL 


 


Differential Comment  


 


Urine Color YELLOW 


 


Urine Turbidity HAZY 


 


Urine pH 5.5 


 


Urine Specific Gravity 1.020 


 


Urine Protein TRACE 


 


Urine Glucose (UA) NEG 


 


Urine Ketones NEG 


 


Urine Occult Blood NEG 


 


Urine Nitrite NEG 


 


Urine Bilirubin NEG 


 


Urine Urobilinogen LESS THAN 2.0 


 


Urine Leukocyte Esterase TRACE 


 


Urine RBC 1 


 


Urine WBC 1 


 


Urine Squamous Epithelial


Cells 5 


 


 


Urine Bacteria RARE 


 


Microscopic Urinalysis Comment


  CATH-CULTURE


IND


 


Blood Urea Nitrogen 35 


 


Creatinine 1.24 


 


Random Glucose 106 


 


Total Protein 7.3 


 


Albumin 3.0 


 


Calcium Level 9.6 


 


Alkaline Phosphatase 98 


 


Aspartate Amino Transf


(AST/SGOT) 41 


 


 


Alanine Aminotransferase


(ALT/SGPT) 41 


 


 


Total Bilirubin 0.5 


 


Sodium Level 138 


 


Potassium Level 3.5 


 


Chloride Level 101 


 


Carbon Dioxide Level 26.9 


 


Anion Gap 10 


 


Estimat Glomerular Filtration


Rate 41 


 














 Date/Time


Source Procedure


Growth Status


 


 


 18 14:19


Urine Catheterized Urine Urine Culture


Pending Received








Result Diagram:  


18 1419                                                                   

             18 1419








Caprini VTE Risk Assessment


Caprini VTE Risk Assessment:  Mod/High Risk (score >= 2)


Caprini Risk Assessment Model











 Point Value = 1          Point Value = 2  Point Value = 3  Point Value = 5


 


Age 41-60


Minor surgery


BMI > 25 kg/m2


Swollen legs


Varicose veins


Pregnancy or postpartum


History of unexplained or recurrent


   spontaneous 


Oral contraceptives or hormone


   replacement


Sepsis (< 1 month)


Serious lung disease, including


   pneumonia (< 1 month)


Abnormal pulmonary function


Acute myocardial infarction


Congestive heart failure (< 1 month)


History of inflammatory bowel disease


Medical patient at bed rest Age 61-74


Arthroscopic surgery


Major open surgery (> 45 min)


Laparoscopic surgery (> 45 min)


Malignancy


Confined to bed (> 72 hours)


Immobilizing plaster cast


Central venous access Age >= 75


History of VTE


Family history of VTE


Factor V Leiden


Prothrombin 67996A


Lupus anticoagulant


Anticardiolipin antibodies


Elevated serum homocysteine


Heparin-induced thrombocytopenia


Other congenital or acquired


   thrombophilia Stroke (< 1 month)


Elective arthroplasty


Hip, pelvis, or leg fracture


Acute spinal cord injury (< 1 month)








Prophylaxis Regimen











   Total Risk


Factor Score Risk Level Prophylaxis Regimen


 


0-1      Low Early ambulation


 


2 Moderate Order ONE of the following:


*Sequential Compression Device (SCD)


*Heparin 5000 units SQ BID


 


3-4 Higher Order ONE of the following medications:


*Heparin 5000 units SQ TID


*Enoxaparin/Lovenox 40 mg SQ daily (WT < 150 kg, CrCl > 30 mL/min)


*Enoxaparin/Lovenox 30 mg SQ daily (WT < 150 kg, CrCl > 10-29 mL/min)


*Enoxaparin/Lovenox 30 mg SQ BID (WT < 150 kg, CrCl > 30 mL/min)


AND/OR


*Sequential Compression Device (SCD)


 


5 or more Highest Order ONE of the following medications:


*Heparin 5000 units SQ TID (Preferred with Epidurals)


*Enoxaparin/Lovenox 40 mg SQ daily (WT < 150 kg, CrCl > 30 mL/min)


*Enoxaparin/Lovenox 30 mg SQ daily (WT < 150 kg, CrCl > 10-29 mL/min)


*Enoxaparin/Lovenox 30 mg SQ BID (WT < 150 kg, CrCl > 30 mL/min)


AND


*Sequential Compression Device (SCD)











Assessment and Plan


Problem List:  


(1) Acute delirium


ICD Codes:  R41.0 - Disorientation, unspecified


Status:  Acute


(2) CAD (coronary artery disease)


ICD Codes:  I25.10 - Atherosclerotic heart disease of native coronary artery 

without angina pectoris


Status:  Chronic


(3) COPD (chronic obstructive pulmonary disease)


ICD Codes:  J44.9 - Chronic obstructive pulmonary disease, unspecified


Status:  Chronic











Sulaiman Joe MD May 30, 2018 18:47

## 2018-05-31 VITALS
HEART RATE: 81 BPM | SYSTOLIC BLOOD PRESSURE: 124 MMHG | TEMPERATURE: 97.8 F | DIASTOLIC BLOOD PRESSURE: 60 MMHG | OXYGEN SATURATION: 86 % | RESPIRATION RATE: 16 BRPM

## 2018-05-31 VITALS
DIASTOLIC BLOOD PRESSURE: 59 MMHG | TEMPERATURE: 98.4 F | HEART RATE: 84 BPM | OXYGEN SATURATION: 90 % | SYSTOLIC BLOOD PRESSURE: 127 MMHG | RESPIRATION RATE: 16 BRPM

## 2018-05-31 VITALS
HEART RATE: 76 BPM | DIASTOLIC BLOOD PRESSURE: 53 MMHG | RESPIRATION RATE: 16 BRPM | TEMPERATURE: 97.8 F | SYSTOLIC BLOOD PRESSURE: 101 MMHG | OXYGEN SATURATION: 90 %

## 2018-05-31 VITALS
OXYGEN SATURATION: 90 % | SYSTOLIC BLOOD PRESSURE: 149 MMHG | DIASTOLIC BLOOD PRESSURE: 68 MMHG | RESPIRATION RATE: 16 BRPM | HEART RATE: 85 BPM | TEMPERATURE: 98.2 F

## 2018-05-31 VITALS
TEMPERATURE: 98.1 F | OXYGEN SATURATION: 91 % | DIASTOLIC BLOOD PRESSURE: 70 MMHG | RESPIRATION RATE: 16 BRPM | HEART RATE: 86 BPM | SYSTOLIC BLOOD PRESSURE: 149 MMHG

## 2018-05-31 VITALS
SYSTOLIC BLOOD PRESSURE: 115 MMHG | OXYGEN SATURATION: 93 % | TEMPERATURE: 98.4 F | RESPIRATION RATE: 16 BRPM | HEART RATE: 80 BPM | DIASTOLIC BLOOD PRESSURE: 57 MMHG

## 2018-05-31 VITALS
TEMPERATURE: 98.3 F | OXYGEN SATURATION: 91 % | RESPIRATION RATE: 16 BRPM | SYSTOLIC BLOOD PRESSURE: 124 MMHG | HEART RATE: 85 BPM | DIASTOLIC BLOOD PRESSURE: 58 MMHG

## 2018-05-31 VITALS — OXYGEN SATURATION: 93 %

## 2018-05-31 LAB
BASOPHILS # BLD AUTO: 0 TH/MM3 (ref 0–0.2)
BASOPHILS NFR BLD: 0.4 % (ref 0–2)
BUN SERPL-MCNC: 26 MG/DL (ref 7–18)
CALCIUM SERPL-MCNC: 8.7 MG/DL (ref 8.5–10.1)
CHLORIDE SERPL-SCNC: 105 MEQ/L (ref 98–107)
CREAT SERPL-MCNC: 0.99 MG/DL (ref 0.5–1)
EOSINOPHIL # BLD: 0.3 TH/MM3 (ref 0–0.4)
EOSINOPHIL NFR BLD: 2.8 % (ref 0–4)
ERYTHROCYTE [DISTWIDTH] IN BLOOD BY AUTOMATED COUNT: 15.2 % (ref 11.6–17.2)
FOLATE SERPL-MCNC: (no result) NG/ML (ref 3.1–17.5)
GFR SERPLBLD BASED ON 1.73 SQ M-ARVRAT: 53 ML/MIN (ref 89–?)
GLUCOSE SERPL-MCNC: 87 MG/DL (ref 74–106)
HCO3 BLD-SCNC: 26.2 MEQ/L (ref 21–32)
HCT VFR BLD CALC: 33.3 % (ref 35–46)
HGB BLD-MCNC: 10.9 GM/DL (ref 11.6–15.3)
LYMPHOCYTES # BLD AUTO: 0.6 TH/MM3 (ref 1–4.8)
LYMPHOCYTES NFR BLD AUTO: 6.1 % (ref 9–44)
MCH RBC QN AUTO: 28.4 PG (ref 27–34)
MCHC RBC AUTO-ENTMCNC: 32.7 % (ref 32–36)
MCV RBC AUTO: 86.8 FL (ref 80–100)
MONOCYTE #: 1.3 TH/MM3 (ref 0–0.9)
MONOCYTES NFR BLD: 12.9 % (ref 0–8)
NEUTROPHILS # BLD AUTO: 7.6 TH/MM3 (ref 1.8–7.7)
NEUTROPHILS NFR BLD AUTO: 77.8 % (ref 16–70)
PLATELET # BLD: 176 TH/MM3 (ref 150–450)
PMV BLD AUTO: 8.3 FL (ref 7–11)
RBC # BLD AUTO: 3.84 MIL/MM3 (ref 4–5.3)
SODIUM SERPL-SCNC: 141 MEQ/L (ref 136–145)
VIT B12 SERPL-MCNC: (no result) PG/ML (ref 193–986)
WBC # BLD AUTO: 9.8 TH/MM3 (ref 4–11)

## 2018-05-31 RX ADMIN — PHENYTOIN SODIUM SCH MLS/HR: 50 INJECTION INTRAMUSCULAR; INTRAVENOUS at 08:44

## 2018-05-31 RX ADMIN — BUDESONIDE AND FORMOTEROL FUMARATE DIHYDRATE SCH PUFF: 160; 4.5 AEROSOL RESPIRATORY (INHALATION) at 10:01

## 2018-05-31 RX ADMIN — BUDESONIDE AND FORMOTEROL FUMARATE DIHYDRATE SCH PUFF: 160; 4.5 AEROSOL RESPIRATORY (INHALATION) at 21:13

## 2018-05-31 RX ADMIN — PRAVASTATIN SODIUM SCH MG: 40 TABLET ORAL at 08:41

## 2018-05-31 RX ADMIN — ACYCLOVIR SCH TAB: 800 TABLET ORAL at 08:41

## 2018-05-31 RX ADMIN — HYDROCHLOROTHIAZIDE SCH MG: 25 TABLET ORAL at 08:41

## 2018-05-31 RX ADMIN — CARVEDILOL SCH MG: 12.5 TABLET, FILM COATED ORAL at 08:41

## 2018-05-31 RX ADMIN — BUDESONIDE AND FORMOTEROL FUMARATE DIHYDRATE SCH PUFF: 160; 4.5 AEROSOL RESPIRATORY (INHALATION) at 21:14

## 2018-05-31 RX ADMIN — ASPIRIN 81 MG SCH MG: 81 TABLET ORAL at 08:41

## 2018-05-31 RX ADMIN — LOSARTAN POTASSIUM SCH MG: 50 TABLET, FILM COATED ORAL at 08:41

## 2018-05-31 RX ADMIN — CARVEDILOL SCH MG: 12.5 TABLET, FILM COATED ORAL at 21:12

## 2018-05-31 NOTE — HHI.PR
Subjective


Remarks


Pt is more alert and oriented to person place and time this morning


Nurse reports that overnight the pt had some confusion





Objective


Vitals





Vital Signs








  Date Time  Temp Pulse Resp B/P (MAP) Pulse Ox O2 Delivery O2 Flow Rate FiO2


 


5/31/18 07:16 98.2 85 16 149/68 (95) 90   


 


5/31/18 04:26   20     


 


5/31/18 03:44 98.1 86 16 149/70 (96) 91   


 


5/31/18 00:54 98.4 84 16 127/59 (81) 90   


 


5/30/18 19:39 98.8 84 16 136/74 (94) 95   


 


5/30/18 19:32        


 


5/30/18 18:16   16     


 


5/30/18 17:38  77 15 148/63 (91) 97 Nasal Cannula 2.00 


 


5/30/18 14:05  87 17 133/73 (93) 95 Room Air  


 


5/30/18 13:43 97.6 81 17 126/72 (90) 96   








Result Diagram:  


5/31/18 0423                                                                   

             5/31/18 0423





Other Results





 Laboratory Tests








Test


  5/30/18


14:19 5/31/18


04:23


 


White Blood Count 11.9 TH/MM3  9.8 TH/MM3 


 


Red Blood Count 4.18 MIL/MM3  3.84 MIL/MM3 


 


Hemoglobin 12.0 GM/DL  10.9 GM/DL 


 


Hematocrit 36.4 %  33.3 % 


 


Mean Corpuscular Volume 87.2 FL  86.8 FL 


 


Mean Corpuscular Hemoglobin 28.7 PG  28.4 PG 


 


Mean Corpuscular Hemoglobin


Concent 32.9 % 


  32.7 % 


 


 


Red Cell Distribution Width 15.2 %  15.2 % 


 


Platelet Count 151 TH/MM3  117 TH/MM3 


 


Mean Platelet Volume 8.5 FL  8.3 FL 


 


Neutrophils (%) (Auto) 79.8 %  77.8 % 


 


Lymphocytes (%) (Auto) 6.7 %  6.1 % 


 


Monocytes (%) (Auto) 11.4 %  12.9 % 


 


Eosinophils (%) (Auto) 1.2 %  2.8 % 


 


Basophils (%) (Auto) 0.9 %  0.4 % 


 


Neutrophils # (Auto) 9.5 TH/MM3  7.6 TH/MM3 


 


Lymphocytes # (Auto) 0.8 TH/MM3  0.6 TH/MM3 


 


Monocytes # (Auto) 1.4 TH/MM3  1.3 TH/MM3 


 


Eosinophils # (Auto) 0.1 TH/MM3  0.3 TH/MM3 


 


Basophils # (Auto) 0.1 TH/MM3  0.0 TH/MM3 


 


CBC Comment DIFF FINAL  AUTO DIFF 


 


Differential Comment   


 


Urine Color YELLOW  


 


Urine Turbidity HAZY  


 


Urine pH 5.5  


 


Urine Specific Gravity 1.020  


 


Urine Protein TRACE mg/dL  


 


Urine Glucose (UA) NEG mg/dL  


 


Urine Ketones NEG mg/dL  


 


Urine Occult Blood NEG  


 


Urine Nitrite NEG  


 


Urine Bilirubin NEG  


 


Urine Urobilinogen


  LESS THAN 2.0


MG/DL 


 


 


Urine Leukocyte Esterase TRACE  


 


Urine RBC 1 /hpf  


 


Urine WBC 1 /hpf  


 


Urine Squamous Epithelial


Cells 5 /hpf 


  


 


 


Urine Bacteria RARE /hpf  


 


Microscopic Urinalysis Comment


  CATH-CULTURE


IND 


 


 


Blood Urea Nitrogen 35 MG/DL  26 MG/DL 


 


Creatinine 1.24 MG/DL  0.99 MG/DL 


 


Random Glucose 106 MG/DL  87 MG/DL 


 


Total Protein 7.3 GM/DL  


 


Albumin 3.0 GM/DL  


 


Calcium Level 9.6 MG/DL  8.7 MG/DL 


 


Alkaline Phosphatase 98 U/L  


 


Aspartate Amino Transf


(AST/SGOT) 41 U/L 


  


 


 


Alanine Aminotransferase


(ALT/SGPT) 41 U/L 


  


 


 


Total Bilirubin 0.5 MG/DL  


 


Sodium Level 138 MEQ/L  141 MEQ/L 


 


Potassium Level 3.5 MEQ/L  3.4 MEQ/L 


 


Chloride Level 101 MEQ/L  105 MEQ/L 


 


Carbon Dioxide Level 26.9 MEQ/L  26.2 MEQ/L 


 


Anion Gap 10 MEQ/L  10 MEQ/L 


 


Estimat Glomerular Filtration


Rate 41 ML/MIN 


  53 ML/MIN 


 








Imaging





Last Impressions








Head CT 5/30/18 1503 Signed





Impressions: 





 CONCLUSION: Negative acute study with no evidence of hemorrhage or infarction.





  





 








Objective Remarks


General: NAD, AAOx3


Chest: CTA


Cardiac: Regular


Abd: +BS, soft ND/NT


Ext: No edema





A/P


Problem List:  


(1) Acute delirium


ICD Codes:  R41.0 - Disorientation, unspecified


Status:  Acute


Plan:  


Acute delirium


- Patient is an 86 y/o WF with COPD, thoracic compression fractures, hx of CAD, 

and cardiomyopathy s/p AICD/PPM


- According to the pts daughter, June, over the last few days the pt just did 

not seem herself and was more agitated and yesterday was delusional and thought 

that her nephews 


 wife was trying to kill her. The pt called the police at 0300 in the morning 

yesterday and asked for a police escort to follow her to her daughters house 

where she stayed until yesterday afternoon. 


- The patients daughter brought her into the ED yesterday to be evaluated due 

to the confusion and delusions. 


- Head CT was negative for any acute changes. 


- Pt had a questionable UTI with a slightly abnormal UA and was given a dose of 

Rocephin in the ED otherwise her labs were stable. 


- Urine culture is pending. 


- The patient returned to her baseline mental status on 5/31. 


- The pts daughter denies any regular alcohol use. The pt was hospitalized a 

few weeks ago for a COPD exacerbation and was discharged on a prednisone taper 

which she completed and a new


 Budesonide/Formoterol inhaler. 


- Pt uses Xanax 1-2 times per day and has been on this for many years. 


- She was started on CBD oil about 1 month ago to see if this may help with her 

anxiety. 


- Pt has an O2 sat monitor at home and there has been no noted decrease below 90

% in her O2 sats per the pts daughter. 


- We will check B12, folate, thiamin, ammonia and RPR


- Pts daughter concerned about early signs of dementia and has discussed this 

with the pts PCP office and is planning to followup outpt with the pts 

Neurologist, Dr. Pate. 


- Supportive care


- We will monitor the pt today and await urine culture results. Anticipate d/c 

home tomorrow. 





COPD


- Pt with recent admission for COPD exacerbation. 


- She completed her Prednisone taper and has continued on the Symbicort inhaler 

BID


- Cont. Symbicort BID


- Duonebs PRN


- O2 as needed





HTN


Hx of CAD


- Home meds continued





(2) COPD (chronic obstructive pulmonary disease)


ICD Codes:  J44.9 - Chronic obstructive pulmonary disease, unspecified


Status:  Chronic


(3) CAD (coronary artery disease)


ICD Codes:  I25.10 - Atherosclerotic heart disease of native coronary artery 

without angina pectoris


Status:  Chronic


Assessment and Plan


Patient examined.


Assessment and plan formulated with Sulma Jackson PA-C.


I agree with the above.


unclear etiology of acute delirium. pt seems  to 


baseline but daughter says she is still not acting herself.


concern about pt cutting down quickly on xanax and replacing


with cbd oil. no uti. no acute cva on CT head.











Sulma Jackson May 31, 2018 07:51


Sulaiman Joe MD May 31, 2018 16:16

## 2018-06-01 ENCOUNTER — HOSPITAL ENCOUNTER (INPATIENT)
Dept: HOSPITAL 17 - H4EA | Age: 83
LOS: 7 days | Discharge: HOSPICE | DRG: 885 | End: 2018-06-08
Attending: PSYCHIATRY & NEUROLOGY | Admitting: PSYCHIATRY & NEUROLOGY
Payer: MEDICARE

## 2018-06-01 VITALS
TEMPERATURE: 97.9 F | SYSTOLIC BLOOD PRESSURE: 106 MMHG | OXYGEN SATURATION: 93 % | RESPIRATION RATE: 16 BRPM | DIASTOLIC BLOOD PRESSURE: 49 MMHG | HEART RATE: 65 BPM

## 2018-06-01 VITALS
RESPIRATION RATE: 20 BRPM | SYSTOLIC BLOOD PRESSURE: 118 MMHG | DIASTOLIC BLOOD PRESSURE: 57 MMHG | HEART RATE: 84 BPM | OXYGEN SATURATION: 90 % | TEMPERATURE: 98.1 F

## 2018-06-01 VITALS
RESPIRATION RATE: 16 BRPM | SYSTOLIC BLOOD PRESSURE: 163 MMHG | TEMPERATURE: 98.7 F | DIASTOLIC BLOOD PRESSURE: 71 MMHG | OXYGEN SATURATION: 90 % | HEART RATE: 81 BPM

## 2018-06-01 VITALS
TEMPERATURE: 98.6 F | HEART RATE: 76 BPM | RESPIRATION RATE: 16 BRPM | SYSTOLIC BLOOD PRESSURE: 109 MMHG | DIASTOLIC BLOOD PRESSURE: 54 MMHG | OXYGEN SATURATION: 93 %

## 2018-06-01 VITALS — OXYGEN SATURATION: 94 %

## 2018-06-01 DIAGNOSIS — Z66: ICD-10-CM

## 2018-06-01 DIAGNOSIS — I11.0: ICD-10-CM

## 2018-06-01 DIAGNOSIS — F41.9: ICD-10-CM

## 2018-06-01 DIAGNOSIS — I42.9: ICD-10-CM

## 2018-06-01 DIAGNOSIS — Z51.5: ICD-10-CM

## 2018-06-01 DIAGNOSIS — Z88.2: ICD-10-CM

## 2018-06-01 DIAGNOSIS — I50.9: ICD-10-CM

## 2018-06-01 DIAGNOSIS — F23: Primary | ICD-10-CM

## 2018-06-01 DIAGNOSIS — Z87.891: ICD-10-CM

## 2018-06-01 DIAGNOSIS — I25.10: ICD-10-CM

## 2018-06-01 DIAGNOSIS — J44.1: ICD-10-CM

## 2018-06-01 DIAGNOSIS — M13.0: ICD-10-CM

## 2018-06-01 DIAGNOSIS — R09.02: ICD-10-CM

## 2018-06-01 LAB
BASOPHILS # BLD AUTO: 0.1 TH/MM3 (ref 0–0.2)
BASOPHILS NFR BLD: 0.6 % (ref 0–2)
BUN SERPL-MCNC: 19 MG/DL (ref 7–18)
CALCIUM SERPL-MCNC: 8.7 MG/DL (ref 8.5–10.1)
CHLORIDE SERPL-SCNC: 103 MEQ/L (ref 98–107)
CREAT SERPL-MCNC: 0.76 MG/DL (ref 0.5–1)
EOSINOPHIL # BLD: 0.2 TH/MM3 (ref 0–0.4)
EOSINOPHIL NFR BLD: 2.4 % (ref 0–4)
ERYTHROCYTE [DISTWIDTH] IN BLOOD BY AUTOMATED COUNT: 14.9 % (ref 11.6–17.2)
GFR SERPLBLD BASED ON 1.73 SQ M-ARVRAT: 72 ML/MIN (ref 89–?)
GLUCOSE SERPL-MCNC: 93 MG/DL (ref 74–106)
HCO3 BLD-SCNC: 28.6 MEQ/L (ref 21–32)
HCT VFR BLD CALC: 31.8 % (ref 35–46)
HGB BLD-MCNC: 10.5 GM/DL (ref 11.6–15.3)
LYMPHOCYTES # BLD AUTO: 0.5 TH/MM3 (ref 1–4.8)
LYMPHOCYTES NFR BLD AUTO: 5.4 % (ref 9–44)
MCH RBC QN AUTO: 28.7 PG (ref 27–34)
MCHC RBC AUTO-ENTMCNC: 33.1 % (ref 32–36)
MCV RBC AUTO: 86.6 FL (ref 80–100)
MONOCYTE #: 1.2 TH/MM3 (ref 0–0.9)
MONOCYTES NFR BLD: 12.8 % (ref 0–8)
NEUTROPHILS # BLD AUTO: 7.5 TH/MM3 (ref 1.8–7.7)
NEUTROPHILS NFR BLD AUTO: 78.8 % (ref 16–70)
PLATELET # BLD: 247 TH/MM3 (ref 150–450)
PMV BLD AUTO: 7.5 FL (ref 7–11)
RBC # BLD AUTO: 3.68 MIL/MM3 (ref 4–5.3)
SODIUM SERPL-SCNC: 140 MEQ/L (ref 136–145)
T4 FREE SERPL-MCNC: 1.28 NG/DL (ref 0.76–1.46)
WBC # BLD AUTO: 9.5 TH/MM3 (ref 4–11)

## 2018-06-01 PROCEDURE — 80048 BASIC METABOLIC PNL TOTAL CA: CPT

## 2018-06-01 PROCEDURE — 83880 ASSAY OF NATRIURETIC PEPTIDE: CPT

## 2018-06-01 PROCEDURE — 94640 AIRWAY INHALATION TREATMENT: CPT

## 2018-06-01 PROCEDURE — 36600 WITHDRAWAL OF ARTERIAL BLOOD: CPT

## 2018-06-01 PROCEDURE — 94664 DEMO&/EVAL PT USE INHALER: CPT

## 2018-06-01 PROCEDURE — 85025 COMPLETE CBC W/AUTO DIFF WBC: CPT

## 2018-06-01 PROCEDURE — 83036 HEMOGLOBIN GLYCOSYLATED A1C: CPT

## 2018-06-01 PROCEDURE — 71045 X-RAY EXAM CHEST 1 VIEW: CPT

## 2018-06-01 PROCEDURE — 83735 ASSAY OF MAGNESIUM: CPT

## 2018-06-01 PROCEDURE — 80061 LIPID PANEL: CPT

## 2018-06-01 PROCEDURE — 82805 BLOOD GASES W/O2 SATURATION: CPT

## 2018-06-01 RX ADMIN — PRAVASTATIN SODIUM SCH MG: 40 TABLET ORAL at 09:42

## 2018-06-01 RX ADMIN — PHENYTOIN SODIUM SCH MLS/HR: 50 INJECTION INTRAMUSCULAR; INTRAVENOUS at 03:18

## 2018-06-01 RX ADMIN — ACYCLOVIR SCH TAB: 800 TABLET ORAL at 09:42

## 2018-06-01 RX ADMIN — LOSARTAN POTASSIUM SCH MG: 50 TABLET, FILM COATED ORAL at 09:41

## 2018-06-01 RX ADMIN — HYDROCHLOROTHIAZIDE SCH MG: 25 TABLET ORAL at 09:41

## 2018-06-01 RX ADMIN — CARVEDILOL SCH MG: 12.5 TABLET, FILM COATED ORAL at 09:42

## 2018-06-01 RX ADMIN — IPRATROPIUM BROMIDE AND ALBUTEROL SULFATE SCH AMPULE: .5; 3 SOLUTION RESPIRATORY (INHALATION) at 09:45

## 2018-06-01 RX ADMIN — ASPIRIN 81 MG SCH MG: 81 TABLET ORAL at 09:41

## 2018-06-01 RX ADMIN — IPRATROPIUM BROMIDE AND ALBUTEROL SULFATE SCH AMPULE: .5; 3 SOLUTION RESPIRATORY (INHALATION) at 13:43

## 2018-06-01 RX ADMIN — BUDESONIDE AND FORMOTEROL FUMARATE DIHYDRATE SCH PUFF: 160; 4.5 AEROSOL RESPIRATORY (INHALATION) at 09:43

## 2018-06-01 NOTE — HHI.PR
Subjective


Remarks


Pt seems much more emotional, agitated, and paranoid this morning. 


She reports that she still cannot remember the events from Tuesday evening/

Wednesday morning but this morning tells me an elaborate and detailed


 story about what happened and is very emotional telling me that her family all 

thinks that she is a liar and that they don't want anything to do with her 

anymore. 


I had spoken with her daughter, June yesterday and she reports that the pt has 

always been "high anxiety" but that the pt has never acted like this before and 

seems much different than her baseline. 


She has rather pressured speech and I was unable to interject much in the 

conversation and pt appears agitated when i do interject. 


She feels that her symptoms could be improved if she was able to be back on the 

CBD oil which she was started on about 1 month ago


Pt reports that she was starting to try to wean off her Xanax last week, which 

she had been on BID dosing for many years, and was taking the Xanax BID every 

other day


 which she states today she started weaning the dose on 5/25. 


She seems much more agitated today than yesterday. 


Yesterday she did not want to take the Xanax but did reluctantly yesterday 

afternoon and then insisted on taking it yesterday evening because she thought 

she "may have a seizure if she doesn't take it"


 which was likely based on a conversation we had yesterday with the pt about 

stopping Benzos abruptly could have side effects, such as seizures.





Objective


Vitals





Vital Signs








  Date Time  Temp Pulse Resp B/P (MAP) Pulse Ox O2 Delivery O2 Flow Rate FiO2


 


6/1/18 07:31 98.7 81 16 163/71 (101) 90   


 


6/1/18 06:40   15     


 


6/1/18 04:00 98.6 76 16 109/54 (72) 93   


 


5/31/18 23:41 98.4 80 16 115/57 (76) 93   


 


5/31/18 20:50 98.3 85 16 124/58 (80) 91   


 


5/31/18 15:32 97.8 76 16 101/53 (69) 90   


 


5/31/18 14:00     93 Nasal Cannula 2.00 


 


5/31/18 11:09 97.8 81 16 124/60 (81) 86   








Result Diagram:  


5/31/18 0423                                                                   

             5/31/18 0423





Other Results





 Laboratory Tests








Test


  5/30/18


14:19 5/31/18


04:23 5/31/18


11:26


 


White Blood Count 11.9 TH/MM3  9.8 TH/MM3  


 


Red Blood Count 4.18 MIL/MM3  3.84 MIL/MM3  


 


Hemoglobin 12.0 GM/DL  10.9 GM/DL  


 


Hematocrit 36.4 %  33.3 %  


 


Mean Corpuscular Volume 87.2 FL  86.8 FL  


 


Mean Corpuscular Hemoglobin 28.7 PG  28.4 PG  


 


Mean Corpuscular Hemoglobin


Concent 32.9 % 


  32.7 % 


  


 


 


Red Cell Distribution Width 15.2 %  15.2 %  


 


Platelet Count 151 TH/MM3  176 TH/MM3  


 


Mean Platelet Volume 8.5 FL  8.3 FL  


 


Neutrophils (%) (Auto) 79.8 %  77.8 %  


 


Lymphocytes (%) (Auto) 6.7 %  6.1 %  


 


Monocytes (%) (Auto) 11.4 %  12.9 %  


 


Eosinophils (%) (Auto) 1.2 %  2.8 %  


 


Basophils (%) (Auto) 0.9 %  0.4 %  


 


Neutrophils # (Auto) 9.5 TH/MM3  7.6 TH/MM3  


 


Lymphocytes # (Auto) 0.8 TH/MM3  0.6 TH/MM3  


 


Monocytes # (Auto) 1.4 TH/MM3  1.3 TH/MM3  


 


Eosinophils # (Auto) 0.1 TH/MM3  0.3 TH/MM3  


 


Basophils # (Auto) 0.1 TH/MM3  0.0 TH/MM3  


 


CBC Comment DIFF FINAL  AUTO DIFF  


 


Differential Comment


   


  AUTO DIFF


CONFIRMED 


 


 


Urine Color YELLOW   


 


Urine Turbidity HAZY   


 


Urine pH 5.5   


 


Urine Specific Gravity 1.020   


 


Urine Protein TRACE mg/dL   


 


Urine Glucose (UA) NEG mg/dL   


 


Urine Ketones NEG mg/dL   


 


Urine Occult Blood NEG   


 


Urine Nitrite NEG   


 


Urine Bilirubin NEG   


 


Urine Urobilinogen


  LESS THAN 2.0


MG/DL 


  


 


 


Urine Leukocyte Esterase TRACE   


 


Urine RBC 1 /hpf   


 


Urine WBC 1 /hpf   


 


Urine Squamous Epithelial


Cells 5 /hpf 


  


  


 


 


Urine Bacteria RARE /hpf   


 


Microscopic Urinalysis Comment


  CATH-CULTURE


IND 


  


 


 


Blood Urea Nitrogen 35 MG/DL  26 MG/DL  


 


Creatinine 1.24 MG/DL  0.99 MG/DL  


 


Random Glucose 106 MG/DL  87 MG/DL  


 


Total Protein 7.3 GM/DL   


 


Albumin 3.0 GM/DL   


 


Calcium Level 9.6 MG/DL  8.7 MG/DL  


 


Alkaline Phosphatase 98 U/L   


 


Aspartate Amino Transf


(AST/SGOT) 41 U/L 


  


  


 


 


Alanine Aminotransferase


(ALT/SGPT) 41 U/L 


  


  


 


 


Total Bilirubin 0.5 MG/DL   


 


Sodium Level 138 MEQ/L  141 MEQ/L  


 


Potassium Level 3.5 MEQ/L  3.4 MEQ/L  


 


Chloride Level 101 MEQ/L  105 MEQ/L  


 


Carbon Dioxide Level 26.9 MEQ/L  26.2 MEQ/L  


 


Anion Gap 10 MEQ/L  10 MEQ/L  


 


Estimat Glomerular Filtration


Rate 41 ML/MIN 


  53 ML/MIN 


  


 


 


Platelet Estimate  NORMAL  


 


Platelet Morphology Comment  CLUMPED  


 


Ammonia   39 MCMOL/L 


 


Vitamin B12 Level


  


  


  GREATER THAN


2000 PG/ML


 


Folate


  


  


  GREATER THAN


20.0 NG/ML








Imaging





Last Impressions








Chest X-Ray 6/1/18 0600 Signed





Impressions: 





 CONCLUSION: 





 Cardiomegaly with some mild interstitial prominence and may represent minimal e





 jen. Mild basilar dependent atelectasis.





  





 


 


Head CT 5/30/18 1503 Signed





Impressions: 





 CONCLUSION: Negative acute study with no evidence of hemorrhage or infarction.





  





 








Objective Remarks


General: NAD, AAOx3


Chest: Slight expiratory wheeze bilaterally


Cardiac: Regular


Abd: +BS, soft ND/NT


Ext: No edema





A/P


Problem List:  


(1) Acute delirium


ICD Codes:  R41.0 - Disorientation, unspecified


Status:  Acute


Plan:  


Acute delirium


- Patient is an 88 y/o WF with COPD, thoracic compression fractures, hx of CAD, 

and cardiomyopathy s/p AICD/PPM


- According to the pts daughter, June, over the last few days the pt just did 

not seem herself and was more agitated and yesterday was delusional and thought 

that her nephews 


 wife was trying to kill her. The pt called the police at 0300 in the morning 

yesterday and asked for a police escort to follow her to her daughters house 

where she stayed until yesterday afternoon. 


- The patients daughter brought her into the ED yesterday to be evaluated due 

to the confusion and delusions. 


- Head CT was negative for any acute changes. 


- Pt had a questionable UTI with a slightly abnormal UA and was given a dose of 

Rocephin in the ED otherwise her labs were stable. 


- Urine culture with probable contaminants 


- The patient seemed to be at her baseline mental status on the morning of 5/31 

but upon re-evaluation in the afternoon seemed much more anxious and her 

daughter felt that the pt was not behaving


 normally. 


- The pts daughter denies any regular alcohol use. The pt was hospitalized a 

few weeks ago for a COPD exacerbation and was discharged on a prednisone taper 

which she completed and a new


 Budesonide/Formoterol inhaler. 


- Pt uses Xanax 1-2 times per day and has been on this for many years. 


- She was started on CBD oil about 1 month ago to see if this may help with her 

anxiety. 


- There was concern about pt cutting down quickly on Xanax and replacing it 

with the CBD oil. She was resumed on her BID dosing of Xanax on 5/31


- Pt has an O2 sat monitor at home and there has been no noted decrease below 90

% in her O2 sats per the pts daughter. 


- B12, folate, thiamin are WNL, ammonia is slighty elevated at 39 and RPR is 

pending


- Check TSH, free T4 and repeat labs this morning


- Pt seems much more paranoid, emotional and agitated this morning


- Consult Psychiatry


- Pts daughter concerned about early signs of dementia and has discussed this 

with the pts PCP office and is planning to followup outpt with the pts 

Neurologist, Dr. Pate. 


- Supportive care





COPD


- Pt with recent admission for COPD exacerbation. 


- She completed her Prednisone taper and has continued on the Symbicort inhaler 

BID


- Cont. Symbicort BID


- Pt with some slight wheezing and is requiring supplemental O2 on 6/1


- Start scheduled duonebs


- Duonebs PRN


- CXR (6/1/18) --> Cardiomegaly with some mild interstitial prominence and may 

represent minimal edema. Mild basilar dependent atelectasis.


- Stop IVF


- O2





HTN


Hx of CAD


- Home meds continued





(2) COPD (chronic obstructive pulmonary disease)


ICD Codes:  J44.9 - Chronic obstructive pulmonary disease, unspecified


Status:  Chronic


(3) CAD (coronary artery disease)


ICD Codes:  I25.10 - Atherosclerotic heart disease of native coronary artery 

without angina pectoris


Status:  Chronic











Sulma Jackson Jun 1, 2018 09:57

## 2018-06-01 NOTE — PD.PSY.CON
Provisional Diagnosis


Admission Date


May 30, 2018 at 17:07


Axis I.


Unspecified psychosis, history of anxiety


Axis II.


Deferred


Axis III.


Hypertension, UTI,





History of Present Illness


Service


Psychiatry


Consult Requested By


ER


Reason for Consult


New onset psychosis


Primary Care Physician


Sedrick Das MD


HPI


The patient is a 87-year-old  woman, domiciled with her nephew in 

[facility], , mother of 2 kids, supported by Social Security, with 

psychiatric history of anxiety, she is on Xanax 0.5 mg twice daily prescribed 

by PCP, no previous psychiatric hospitalizations, no previous suicide attempts, 

with a medical history of COPD, hypertension, who presents the emergency room 

for evaluation of altered mental status.  As per patient's daughter, patient 

was at home with family members.  Reports that patient she began to become 

paranoid last night. Reports that she is convinced that her nephew's wife whom 

she lives with is trying to kill her. Reports that she called the  last 

night around 3am and had them follow her while she drove to her daughter's home 

in [place].  Reports that patient has never acted like this in the past. 

Patient with no psych history.  She has being admitted under observation due to 

Acute delirium. Head CT was negative for any acute changes. Pt had a 

questionable UTI with a slightly abnormal UA and was given a dose of Rocephin 

in the ED otherwise her labs were stable.  Consulted to psychiatry to address 

psychosis.  On psychiatric evaluation I find the patient is very talkative, at 

times pressure, difficult to redirect.  Patient presents with very labile mood, 

mood swings, emotional incontinence.  She received me crying profusely, stating 

that her family is plotting to put her in the hospital.  The patient states 

that she feels very guilty "because I done something very terrible to my family

, I called the police because I know something wrong was happening to my 

daughter, but now she is upset with me".  She also reports that several member 

of her family and lying to and they want to call the police on her.  The 

patient has to be redirected multiple times during the evaluation due to her 

pressure speech.  The patient is fully oriented 3.  She knows who is the 

 she denies the use of illegal drugs and alcohol.

  Her daughter June Beer, he states that the patient is acting out of character 

for the last week.  He says that the patient has been talking to herself, 

making a lot of accusations with no based in the reality, and acting really 

bizarre.  For example, 2 nights ago she called the police reporting that her 

daughter was in danger made the police to go to her daughter's house, but 

really nothing was happening.  She also has been sleeping poorly, "and talking 

even when she is asleep".





Review of Systems


Constitutional:  DENIES: Diaphoretic episodes, Fatigue, Fever, Weight gain, 

Weight loss, Chills, Dizziness, Change in appetite, Night Sweats


Endocrine:  DENIES: Abnorml menstrual pattern, Heat/cold intolerance, Polydipsia

, Polyuria, Polyphagia


Eyes:  DENIES: Blurred vision, Diplopia, Eye inflammation, Eye pain, Vision loss

, Photosensitivity, Double Vision


Ears, nose, mouth, throat:  DENIES: Tinnitus, Hearing loss, Vertigo, Nasal 

discharge, Oral lesions, Throat pain, Hoarseness, Ear Pain, Running Nose, 

Epistaxis, Sinus Pain, Toothache, Odynophagia


Respiratory:  DENIES: Apneas, Cough, Snoring, Wheezing, Hemoptysis, Sputum 

production, Shortness of breath


Cardiovascular:  DENIES: Chest pain, Palpitations, Syncope, Dyspnea on Exertion

, PND, Lower Extremity Edema, Orthopnea, Claudication


Gastrointestinal:  DENIES: Abdominal pain, Black stools, Bloody stools, 

Constipation, Diarrhea, Nausea, Vomiting, Difficulty Swallowing, Anorexia


Genitourinary:  DENIES: Abnormal vaginal bleeding, Dysmenorrhea, Dyspareunia, 

Sexual dysfunction, Urinary frequency, Urinary incontinence, Urgency, Hematuria

, Dysuria, Nocturia, Vaginal discharge


Musculoskeletal:  DENIES: Joint pain, Muscle aches, Stiffness, Joint Swelling, 

Back pain, Neck pain


Integumentary:  DENIES: Abnormal pigmentation, Pruritus, Rash, Nail changes, 

Breast masses, Breast skin changes, Nipple discharge


Hematologic/lymphatic:  DENIES: Bruising, Lymphadenopathy


Immunologic/allergic:  DENIES: Eczema, Urticaria


Neurologic:  DENIES: Abnormal gait, Headache, Localized weakness, Paresthesias, 

Seizures, Speech Problems, Tremor, Poor Balance


Psychiatric:  COMPLAINS OF: Anxiety, Hallucinations, Agitation, Delusions





Past Family Social History


Coded Allergies:  


     Sulfa (Sulfonamide Antibiotics) (Unverified  Allergy, Severe, Rash, 5/8/18)


     cilostazol (Unverified  Allergy, Severe, Itching, 5/8/18)


     niacin (Unverified  Allergy, Severe, Rash, 5/8/18)


     sulfisoxazole (Unverified  Allergy, Severe, Rash, 5/8/18)


Reported Medications


Budesonide-Formoterol Inh (Symbicort Inh) 160-4.5 Mcg/Act Aero, 2 PUFF INH Q12HR

, #1 INHALER 0 Refills


   5/31/18


Promethazine-Codeine Liq (Promethazine-Codeine Liq) 6.25-10 Mg/5 Ml Syrp, 10 ML 

PO BID Y for COUGH AND/OR COLD SYMPTOMS, ML 0 Refills


   5/30/18


Losartan (Losartan) 50 Mg Tab, 50 MG PO DAILY for Blood Pressure Management, #

30 TAB 0 Refills


   5/30/18


Hydrochlorothiazide (Hydrochlorothiazide) 25 Mg Tab, 25 MG PO DAILY, #30 TAB 0 

Refills


   5/30/18


Albuterol Neb (Albuterol Neb) 2.5 Mg/0.5 Ml Neb, 2.5 MG NEB DAILY for Breathing 

Treatment, #120 NEBULE 0 Refills


   Note: The Albuterol Sulfate Inhalation Solution is concentrated and


   must be diluted. Read complete instructions carefully before using.


   5/30/18


Tramadol (Tramadol) 50 Mg Tab, 50 MG PO Q4H Y for CRAMPS, TAB 0 Refills


   5/8/18


Cyanocobalamin (Vitamin B-12) 1,000 Mcg Subl, 1000 MCG SL DAILY for Nutritional 

Supplement, TAB.SL 0 Refills


   5/8/18


Albuterol 18 GM Inh (Ventolin Hfa 18 GM Inh) 90 Mcg/Act Aer, 2 PUFF INH Q6H Y 

for SHORTNESS OF BREATH, #1 INHALER 0 Refills


   3/4/18


Temazepam (Temazepam) 15 Mg Cap, 15 MG PO HS Y for INSOMNIA, #30 CAP 0 Refills


   3/4/18


Multiple Vitamin (Multiple Vitamin) 1 Tab, 1 TAB PO DAILY for Nutritional 

Supplement, TAB 0 Refills


   3/4/18


Lovastatin (Lovastatin) 40 Mg Tab, 80 MG PO DAILY for Cholesterol Management, #

60 TAB 0 Refills


   3/4/18


[Ipratropium Bromide] (Ipratropium Bromide)   No Conflict Check


   3/4/18


Carvedilol (Carvedilol) 12.5 Mg Tab, 12.5 MG PO BID, #60 TAB 0 Refills


   3/4/18


Aspirin (Aspirin Children's) 81 Mg Chew, 81 MG CHEW DAILY, TAB 0 Refills


   3/4/18


Alprazolam (Alprazolam) 0.5 Mg Tab, 0.5 MG PO BID Y for ANXIETY, TAB 0 Refills


   3/4/18


Discontinued Reported Medications


Losartan (Losartan) 50 Mg Tab, 50 MG PO BID for Blood Pressure Management, #30 

TAB 0 Refills


   3/4/18


Hydrochlorothiazide (Hydrochlorothiazide) 12.5 Mg Cap, 12.5 MG PO DAILY, #30 

CAP 0 Refills


   3/4/18


Hydrocodone-Acetaminophen (Hydrocodone-Acetaminophen) 5-325 mg Tab, 1 TAB PO 

Q6H Y for PAIN, TAB 0 Refills


   3/4/18


Discontinued Scripts


Acetaminophen (Tylenol) 325 Mg Tab, 650 MG PO Q6H Y for pain for 30 Days, #240 

TAB 0 Refills


   Prov:Zachery Bell DO         5/12/18


Fluticasone-Salmeterol Inh (Airduo Respiclick Inh) 113-14 Mcg Inh, 1 PUFF INH 

BID for COPD, #1 INH 0 Refills


   Prov:Hanna Willis         5/12/18


Prednisone (Prednisone) 10 Mg Tab, 10 MG PO AS DIRECTED for steroid taper, #21 

TAB 0 Refills


   take 20 mg twice a day by mouth for three days,


   then take 20 mg once a day by mouth for three days,


   then take 10 mg once a day by mouth for three days,


   then stop.


   Prov:Hanna Willis         5/11/18





Current Medications








 Medications


  (Trade)  Dose


 Ordered  Sig/Vic


 Route  Start Time


 Stop Time Status Last Admin


 


  (NS Flush)  2 ml  UNSCH  PRN


 IV FLUSH  5/30/18 14:15


     


 


 


  (Aspirin Chew)  81 mg  DAILY


 CHEW  5/31/18 09:00


    6/1/18 09:41


 


 


  (Coreg)  12.5 mg  BID


 PO  5/30/18 21:00


    6/1/18 09:42


 


 


  (Hydrodiuril)  25 mg  DAILY


 PO  5/31/18 09:00


    6/1/18 09:41


 


 


  (Cozaar)  50 mg  DAILY


 PO  5/31/18 09:00


    6/1/18 09:41


 


 


  (Restoril)  15 mg  HS  PRN


 PO  5/30/18 18:45


    5/30/18 22:56


 


 


  (Theragran)  1 tab  DAILY


 PO  5/31/18 09:00


    6/1/18 09:42


 


 


  (Ultram)  50 mg  Q6H  PRN


 PO  5/30/18 19:00


    6/1/18 14:54


 


 


  (Duoneb Neb)  1 ampule  Q6HR NEB  PRN


 NEB  5/30/18 19:00


     


 


 


  (Symbicort


 160-4.5 Mcg Inh)  2 puff  Q12HR


 INH  5/31/18 09:00


    6/1/18 09:43


 


 


  (Xanax)  0.5 mg  BID


 PO  6/1/18 09:00


    6/1/18 09:42


 


 


  (Xanax)  0.5 mg  BID  PRN


 PO  5/31/18 20:30


    5/31/18 21:12


 


 


  (Duoneb Neb)  1 ampule  Q6HR WHILE AWAKE  NEB


 NEB  6/1/18 09:45


    6/1/18 13:43


 


 


  (Pravachol)  80 mg  HS


 PO  6/2/18 21:00


     


 








Family Psych History


No family psychiatric history


Social History


Patient was born and raised in [place], she lives in [facility] with her 

nephew, she is , has 2 daughters, supported by Social Security, her 

highest level of education is high school


Patient's Strengths (min. 2)


Family support





Physical Exam


Patient is agitated, hyperactive, but no tremors, no EPS, no withdrawal 

symptoms present


Vital Signs





Vital Signs








  Date Time  Temp Pulse Resp B/P (MAP) Pulse Ox O2 Delivery O2 Flow Rate FiO2


 


6/1/18 15:32 98.1 84 20 118/57 (77) 90   


 


5/31/18 14:00      Nasal Cannula 2.00 








Lab Results











Test


  6/1/18


13:19


 


White Blood Count 9.5 TH/MM3 


 


Red Blood Count 3.68 MIL/MM3 


 


Hemoglobin 10.5 GM/DL 


 


Hematocrit 31.8 % 


 


Mean Corpuscular Volume 86.6 FL 


 


Mean Corpuscular Hemoglobin 28.7 PG 


 


Mean Corpuscular Hemoglobin


Concent 33.1 % 


 


 


Red Cell Distribution Width 14.9 % 


 


Platelet Count 247 TH/MM3 


 


Mean Platelet Volume 7.5 FL 


 


Neutrophils (%) (Auto) 78.8 % 


 


Lymphocytes (%) (Auto) 5.4 % 


 


Monocytes (%) (Auto) 12.8 % 


 


Eosinophils (%) (Auto) 2.4 % 


 


Basophils (%) (Auto) 0.6 % 


 


Neutrophils # (Auto) 7.5 TH/MM3 


 


Lymphocytes # (Auto) 0.5 TH/MM3 


 


Monocytes # (Auto) 1.2 TH/MM3 


 


Eosinophils # (Auto) 0.2 TH/MM3 


 


Basophils # (Auto) 0.1 TH/MM3 


 


CBC Comment DIFF FINAL 


 


Differential Comment  


 


Blood Urea Nitrogen 19 MG/DL 


 


Creatinine 0.76 MG/DL 


 


Random Glucose 93 MG/DL 


 


Calcium Level 8.7 MG/DL 


 


Sodium Level 140 MEQ/L 


 


Potassium Level 4.3 MEQ/L 


 


Chloride Level 103 MEQ/L 


 


Carbon Dioxide Level 28.6 MEQ/L 


 


Anion Gap 8 MEQ/L 


 


Estimat Glomerular Filtration


Rate 72 ML/MIN 


 


 


Free Thyroxine 1.28 NG/DL 


 


Thyroid Stimulating Hormone


3rd Gen 0.377 uIU/ML 


 














 Date/Time


Source Procedure


Growth Status


 


 


 5/30/18 14:19


Urine Catheterized Urine Urine Culture - Final


,000 CFU/ML MIXED GRAM POSITIVE... Complete











Mental Status Examination


Appearance:  Appropriate


Consciousness:  Alert


Orientation:  x4


Motor Activity:  Normal gait


Speech:  Pressured, Rapid


Language:  Adequate


Fund of Knowledge:  Adequate


Attention and Concentration:  Adequate


Memory:  Unremarkable


Mood:  Irritable, Manic


Affect:  Irritable, Labile


Thought Process & Associations:  Intact


Thought Content:  Appropriate


Hallucination Type:  None


Delusion Type:  Paranoid


Suicidal Ideation:  No


Suicidal Plan:  No


Suicidal Intention:  No


Homicidal Ideation:  No


Homicidal Plan:  No


Homicidal Intention:  No


Insight:  Poor


Judgment:  Poor





Assessment & Plan


Problem List:  


(1) Unspecified psychosis


ICD Codes:  F29 - Unspecified psychosis not due to a substance or known 

physiological condition


Assessment & Plan:  On psychiatric evaluation today I find a patient that is 

very labile, emotionally incontinent, talkative, at times pressure, difficult 

to redirect, with prominent paranoia, internally preoccupied for the last week, 

as per daughter.  The daughter also reports poor sleep, paranoid delusions at 

home and self talking.  This is a patient with a psychiatry history just of 

anxiety, no previous psychiatric hospitalizations, no previous suicide attempts

, she has been using Xanax 0.5 mg twice daily, but no previous episodes of 

psychosis or veronica.  The patient denies the use of alcohol and illegal drugs.  

She is fully oriented 3, with attention deficit, but not fluctuation of 

consciousness.  Current presentation seems to be consistent with new onset 

psychosis that could be related with underlying medical conditions, such as a 

UTI for example.  A primary psychotic process is less probable to start at this 

age.  Given the level of psychosis and disruption in patient functionality the 

patient needs psychiatric hospitalization for stabilization.  Patient has been 

Baker acted due to her risk of danger to self and others.  Will be started in 

Seroquel 12.5 mg twice daily.  She might benefit of a mood stabilizer such as 

Depakote.  Continue Xanax 0.5 mg twice daily.  Transferred the patient to med 

psych once medically stable





Assessment & Plan


Estimated LOS:  days











Bobby Hoffman MD Jun 1, 2018 16:27

## 2018-06-01 NOTE — RADRPT
EXAM DATE:  6/1/2018 5:48 AM EDT

AGE/SEX:        87 years / Female



INDICATIONS:  Short of breath.



CLINICAL DATA:  This is the patient's initial encounter. Patient reports that signs and symptoms have
 been present for 2 days and indicates a pain score of 0/10. 

                                                                          

MEDICAL/SURGICAL HISTORY:       Chronic obstructive pulmonary disease.  Hypertension.  Cardiovascular
 disease.  CHF. Myocardial infarction. Hypercholesterolemia.  Pacemaker.  Defibrillator.  Appendectom
y.  Stents.



COMPARISON:      [facility] [place], CHEST SINGLE AP, 5/10/2018.  .



FINDINGS:  

There is cardiomegaly. Questionable mild interstitial edema pattern with basilar dependent atelectasi
s. Tortuous aorta. Pacer lead overlies right ventricle. Tortuous aorta. Kyphoplasty changes noted in 
the spine.



CONCLUSION: 

Cardiomegaly with some mild interstitial prominence and may represent minimal edema. Mild basilar dep
endent atelectasis.



Electronically signed by: Justo Polo MD  6/1/2018 5:54 AM EDT

## 2018-06-02 VITALS
RESPIRATION RATE: 18 BRPM | SYSTOLIC BLOOD PRESSURE: 121 MMHG | DIASTOLIC BLOOD PRESSURE: 74 MMHG | OXYGEN SATURATION: 93 % | HEART RATE: 78 BPM | TEMPERATURE: 97.4 F

## 2018-06-02 VITALS
RESPIRATION RATE: 17 BRPM | HEART RATE: 79 BPM | OXYGEN SATURATION: 89 % | TEMPERATURE: 96.1 F | SYSTOLIC BLOOD PRESSURE: 125 MMHG | DIASTOLIC BLOOD PRESSURE: 59 MMHG

## 2018-06-02 VITALS — OXYGEN SATURATION: 93 %

## 2018-06-02 VITALS — OXYGEN SATURATION: 92 %

## 2018-06-02 LAB
BUN SERPL-MCNC: 20 MG/DL (ref 7–18)
CALCIUM SERPL-MCNC: 8.3 MG/DL (ref 8.5–10.1)
CHLORIDE SERPL-SCNC: 104 MEQ/L (ref 98–107)
CHOLEST SERPL-MCNC: 60 MG/DL (ref 120–200)
CHOLESTEROL/ HDL RATIO: 2.66 RATIO
CREAT SERPL-MCNC: 0.76 MG/DL (ref 0.5–1)
GFR SERPLBLD BASED ON 1.73 SQ M-ARVRAT: 72 ML/MIN (ref 89–?)
GLUCOSE SERPL-MCNC: 86 MG/DL (ref 74–106)
HCO3 BLD-SCNC: 27.8 MEQ/L (ref 21–32)
HDLC SERPL-MCNC: 22.5 MG/DL (ref 40–60)
LDLC SERPL-MCNC: 17 MG/DL (ref 0–99)
SODIUM SERPL-SCNC: 142 MEQ/L (ref 136–145)
TRIGL SERPL-MCNC: 101 MG/DL (ref 42–150)

## 2018-06-02 RX ADMIN — IPRATROPIUM BROMIDE AND ALBUTEROL SULFATE SCH AMPULE: .5; 3 SOLUTION RESPIRATORY (INHALATION) at 19:55

## 2018-06-02 RX ADMIN — CARVEDILOL SCH MG: 12.5 TABLET, FILM COATED ORAL at 12:00

## 2018-06-02 RX ADMIN — LORAZEPAM PRN MG: 0.5 TABLET ORAL at 12:00

## 2018-06-02 RX ADMIN — IPRATROPIUM BROMIDE AND ALBUTEROL SULFATE SCH AMPULE: .5; 3 SOLUTION RESPIRATORY (INHALATION) at 08:50

## 2018-06-02 RX ADMIN — IPRATROPIUM BROMIDE AND ALBUTEROL SULFATE SCH AMPULE: .5; 3 SOLUTION RESPIRATORY (INHALATION) at 13:56

## 2018-06-02 RX ADMIN — LOSARTAN POTASSIUM SCH MG: 50 TABLET, FILM COATED ORAL at 10:00

## 2018-06-02 RX ADMIN — CARVEDILOL SCH MG: 12.5 TABLET, FILM COATED ORAL at 20:42

## 2018-06-02 RX ADMIN — ASPIRIN 81 MG SCH MG: 81 TABLET ORAL at 12:00

## 2018-06-02 RX ADMIN — ACETAMINOPHEN PRN MG: 325 TABLET ORAL at 16:00

## 2018-06-02 NOTE — HHI.PYPN
Subjective


Remarks


 history of delirium admitted 5/30/18 under visit [id number] was discharged 

medically cleared on 6/1 transferred to [facility].  While on the medical floor 

patient was seen by Dr. Gayle in consultation and he recommended transfer 

this unit when medically stable for that.  Patient is been transferred to this 

unit patient seen by me today she is alert fairly well oriented young appearing 

87-year-old somewhat disorganized paranoid vigilant and sarcastic.  She is 

showing some flavor of paranoia vigilance towards her children also.  The 

stomach feel patient does meet Malik criteria I agree with Dr. Gayle thus I 

will do first opinion petition supporting King act I feel she does have 

capacity to sign for medication at this time





Review of Systems


Except as stated in HPI:  all other systems reviewed are Neg





Mental Status Examination


Appearance:  Appropriate


Consciousness:  Alert


Orientation:  Person, Place, Date/Time (Somewhat vague)


Motor Activity:  Other (Patient seen in bed)


Speech:  Pressured, Rapid


Language:  Adequate


Fund of Knowledge:  Adequate


Attention and Concentration:  Easily Distracted


Memory:  Impaired


Mood:  Irritable, Other (Euthymic)


Affect:  Other (Decreased range and intensity)


Thought Process & Associations:  Disorganized


Thought Content:  Bizarre thinking


Hallucination Type:  None


Delusion Type:  Paranoid (Mildly)


Suicidal Ideation:  No


Suicidal Plan:  No


Suicidal Intention:  No


Homicidal Ideation:  No


Homicidal Plan:  No


Homicidal Intention:  No


Insight:  Poor


Judgment:  Poor





Results


Labs











Test


  6/2/18


06:20


 


Blood Urea Nitrogen 20 MG/DL 


 


Creatinine 0.76 MG/DL 


 


Random Glucose 86 MG/DL 


 


Calcium Level 8.3 MG/DL 


 


Sodium Level 142 MEQ/L 


 


Potassium Level 3.5 MEQ/L 


 


Chloride Level 104 MEQ/L 


 


Carbon Dioxide Level 27.8 MEQ/L 


 


Anion Gap 10 MEQ/L 


 


Estimat Glomerular Filtration


Rate 72 ML/MIN 


 


 


Triglycerides Level 101 MG/DL 


 


Cholesterol Level 60 MG/DL 


 


LDL Cholesterol 17 MG/DL 


 


HDL Cholesterol 22.5 MG/DL 


 


Cholesterol/HDL Ratio 2.66 RATIO 








Vitals/IOs





Vital Signs








  Date Time  Temp Pulse Resp B/P (MAP) Pulse Ox O2 Delivery O2 Flow Rate FiO2


 


6/2/18 08:41     93 Nasal Cannula 4.00 


 


6/2/18 04:33 96.1 79 17 125/59 (81)    














Intake and Output   


 


 6/2/18 6/2/18 6/3/18





 08:00 16:00 00:00


 


Intake Total 240 ml 960 ml 


 


Balance 240 ml 960 ml 











Assessment & Plan


Problem List:  


(1) Unspecified psychosis


ICD Codes:  F29 - Unspecified psychosis not due to a substance or known 

physiological condition


Assessment & Plan


Estimated LOS:  days patient meets criteria for involuntary psychiatric 

hospitalization I will do first opinion request second opinion to continue the 

medical consult.  Start patient on Seroquel as recommended by Dr. Gayle also


Justification for Cont. Inpt.


At this time patient would decompensate a place to a lower level of care


Discharge Planning


To be determined possibly back with family


Request HC Surrog/Guard Advoc?:  No











Carter Casey MD Jun 2, 2018 16:48

## 2018-06-02 NOTE — HHI.PR
Subjective


Remarks


pt emotionally labile this AM


asking for heating pad for back.


focused on conflicts with her family.





Objective


Vitals


heart reg


lung scattered wheeze


abd s/nt


ext no edema


Vital Signs








  Date Time  Temp Pulse Resp B/P (MAP) Pulse Ox O2 Delivery O2 Flow Rate FiO2


 


6/2/18 04:33 96.1 79 17 125/59 (81) 89   


 


6/1/18 20:18     94 Nasal Cannula 4.00 














 6/2/18 6/2/18 6/3/18





 15:00 23:00 07:00


 


Intake Total 240 ml  


 


Balance 240 ml  


 


   


 


Intake Oral 240 ml  








Result Diagram:  


6/2/18 0620








A/P


Problem List:  


(1) Psychosis


ICD Codes:  F29 - Unspecified psychosis not due to a substance or known 

physiological condition


Status:  Acute


Plan:  


1. psychosis. Pt presented emotionally labile. pressured speech. agitated


    has been tapering her own xanax at home for cbd oil. No obvious


    metabolic or infectious etiology found for mental status changes. ct head


    negative for acute cva


2. copd with mild flare


3. cad. stable


4. htn.





cont scheduled nebs for copd. oxygen


cont home bp meds. hold hctz today and resume as tolerated


kcl today.


heating pad


psychiatry managing her agitation/psychosis.





(2) COPD (chronic obstructive pulmonary disease)


ICD Codes:  J44.9 - Chronic obstructive pulmonary disease, unspecified


Status:  Acute


(3) CAD (coronary artery disease)


ICD Codes:  I25.10 - Atherosclerotic heart disease of native coronary artery 

without angina pectoris


Status:  Chronic


(4) HTN (hypertension)


ICD Codes:  I10 - Essential (primary) hypertension


Status:  Chronic











Sulaiman Joe MD Jun 2, 2018 08:40

## 2018-06-03 VITALS
RESPIRATION RATE: 17 BRPM | SYSTOLIC BLOOD PRESSURE: 175 MMHG | HEART RATE: 84 BPM | OXYGEN SATURATION: 93 % | TEMPERATURE: 96.9 F | DIASTOLIC BLOOD PRESSURE: 77 MMHG

## 2018-06-03 VITALS
HEART RATE: 78 BPM | DIASTOLIC BLOOD PRESSURE: 58 MMHG | RESPIRATION RATE: 16 BRPM | TEMPERATURE: 98 F | OXYGEN SATURATION: 89 % | SYSTOLIC BLOOD PRESSURE: 118 MMHG

## 2018-06-03 VITALS — OXYGEN SATURATION: 93 %

## 2018-06-03 VITALS — OXYGEN SATURATION: 92 %

## 2018-06-03 LAB — HBA1C MFR BLD: 6.4 % (ref 4.3–6)

## 2018-06-03 RX ADMIN — LORAZEPAM PRN MG: 0.5 TABLET ORAL at 14:03

## 2018-06-03 RX ADMIN — LOSARTAN POTASSIUM SCH MG: 50 TABLET, FILM COATED ORAL at 09:00

## 2018-06-03 RX ADMIN — IPRATROPIUM BROMIDE AND ALBUTEROL SULFATE SCH AMPULE: .5; 3 SOLUTION RESPIRATORY (INHALATION) at 09:05

## 2018-06-03 RX ADMIN — LORAZEPAM PRN MG: 0.5 TABLET ORAL at 00:06

## 2018-06-03 RX ADMIN — ASPIRIN 81 MG SCH MG: 81 TABLET ORAL at 09:00

## 2018-06-03 RX ADMIN — ACETAMINOPHEN PRN MG: 325 TABLET ORAL at 08:59

## 2018-06-03 RX ADMIN — IPRATROPIUM BROMIDE AND ALBUTEROL SULFATE SCH AMPULE: .5; 3 SOLUTION RESPIRATORY (INHALATION) at 19:58

## 2018-06-03 RX ADMIN — CARVEDILOL SCH MG: 12.5 TABLET, FILM COATED ORAL at 20:19

## 2018-06-03 RX ADMIN — IPRATROPIUM BROMIDE AND ALBUTEROL SULFATE SCH AMPULE: .5; 3 SOLUTION RESPIRATORY (INHALATION) at 12:19

## 2018-06-03 RX ADMIN — CARVEDILOL SCH MG: 12.5 TABLET, FILM COATED ORAL at 09:00

## 2018-06-03 NOTE — HHI.PYPN
Subjective


Remarks


Patient is seen lying in bed with nurse Lance, chart reviewed, patient is 

showing some complaints of medication at this time.  Patient not as angry or 

paranoid though still manipulative, still showing some mild diffuse confusion 

but did recognize me from yesterday.  For now continue treatment





Review of Systems


Except as stated in HPI:  all other systems reviewed are Neg





Mental Status Examination


Appearance:  Appropriate


Consciousness:  Alert


Orientation:  Person, Place, Date/Time (Somewhat vague)


Motor Activity:  Other (Patient seen in bed)


Speech:  Pressured, Rapid


Language:  Adequate


Fund of Knowledge:  Adequate


Attention and Concentration:  Easily Distracted


Memory:  Impaired


Mood:  Irritable, Other (Euthymic)


Affect:  Other (Decreased range and intensity)


Thought Process & Associations:  Disorganized


Thought Content:  Bizarre thinking


Hallucination Type:  None


Delusion Type:  Paranoid (Mildly)


Suicidal Ideation:  No


Suicidal Plan:  No


Suicidal Intention:  No


Homicidal Ideation:  No


Homicidal Plan:  No


Homicidal Intention:  No


Insight:  Poor


Judgment:  Poor





Results


Vitals/IOs





Vital Signs








  Date Time  Temp Pulse Resp B/P (MAP) Pulse Ox O2 Delivery O2 Flow Rate FiO2


 


6/3/18 09:05     92 Nasal Cannula 4.00 


 


6/3/18 05:23 96.9 84 17 175/77 (109)    











Assessment & Plan


Problem List:  


(1) Unspecified psychosis


ICD Codes:  F29 - Unspecified psychosis not due to a substance or known 

physiological condition


Assessment & Plan


Estimated LOS:  days patient continues somewhat confused psychotic mildly 

paranoid though softer with a decrease in the range and intensity of it.  

Compliant medications


Justification for Cont. Inpt.


At this time patient would decompensate a place to the lower level of care


Discharge Planning


To be determined


Request HC Surrog/Guard Advoc?:  No











Carter Casey MD Héctor 3, 2018 10:40

## 2018-06-03 NOTE — HHI.PR
Subjective


Remarks


crying and upset about last night. says she


was fighting with staff members.





Objective


Vitals


heart reg


lung scattered wheeze


abd s/nt


ext no edema


Vital Signs








  Date Time  Temp Pulse Resp B/P (MAP) Pulse Ox O2 Delivery O2 Flow Rate FiO2


 


6/3/18 05:23 96.9 84 17 175/77 (109) 93   


 


6/2/18 19:58     92 Nasal Cannula 4.00 


 


6/2/18 18:05 97.4 78 18 121/74 (90) 93   








Result Diagram:  


6/2/18 0620








A/P


Problem List:  


(1) Psychosis


ICD Codes:  F29 - Unspecified psychosis not due to a substance or known 

physiological condition


Status:  Acute


Plan:  


1. psychosis. Pt presented emotionally labile. pressured speech. agitated


    has been tapering her own xanax at home for cbd oil. No obvious


    metabolic or infectious etiology found for mental status changes. ct head


    negative for acute cva


2. copd with mild flare


3. cad. stable


4. htn.





cont scheduled nebs for copd. oxygen


cont home bp meds. hold hctz today and resume as tolerated


heating pad


psychiatry managing her agitation/psychosis.





(2) COPD (chronic obstructive pulmonary disease)


ICD Codes:  J44.9 - Chronic obstructive pulmonary disease, unspecified


Status:  Acute


(3) CAD (coronary artery disease)


ICD Codes:  I25.10 - Atherosclerotic heart disease of native coronary artery 

without angina pectoris


Status:  Chronic


(4) HTN (hypertension)


ICD Codes:  I10 - Essential (primary) hypertension


Status:  Chronic











Sulaiman Joe MD Héctor 3, 2018 08:53

## 2018-06-03 NOTE — HHI.PYPN
Subjective


Remarks


This is a request for second opinion.  Admission note was reviewed and I agree 

with the history.  Patient was seen and case was discussed with nursing.  

Patient remains hyperverbal, disorganized with mild paranoia.  Denies auditory 

or visual hallucinations.  Compliant with her medications.  No outbursts





Mental Status Examination


Appearance:  Appropriate


Consciousness:  Alert


Orientation:  Person, Place, Date/Time (Somewhat vague)


Motor Activity:  Other (Patient seen in bed)


Speech:  Pressured, Rapid


Language:  Adequate


Fund of Knowledge:  Adequate


Attention and Concentration:  Easily Distracted


Memory:  Impaired


Mood:  Irritable, Other (Euthymic)


Affect:  Other (Decreased range and intensity)


Thought Process & Associations:  Disorganized


Thought Content:  Bizarre thinking, Depersonalization


Hallucination Type:  None


Delusion Type:  Paranoid (Mildly)


Suicidal Ideation:  No


Suicidal Plan:  No


Suicidal Intention:  No


Homicidal Ideation:  No


Homicidal Plan:  No


Homicidal Intention:  No


Insight:  Poor


Judgment:  Poor





Results


Vitals/IOs





Vital Signs








  Date Time  Temp Pulse Resp B/P (MAP) Pulse Ox O2 Delivery O2 Flow Rate FiO2


 


6/3/18 09:05     92 Nasal Cannula 4.00 


 


6/3/18 05:23 96.9 84 17 175/77 (109)    














Intake and Output   


 


 6/3/18 6/3/18 6/4/18





 08:00 16:00 00:00


 


Intake Total 120 ml  


 


Balance 120 ml  











Assessment & Plan


Problem List:  


(1) Unspecified psychosis


ICD Codes:  F29 - Unspecified psychosis not due to a substance or known 

physiological condition


Assessment & Plan


I agree with the first opinion to continue petition.  Criteria include acute 

psychosis


Justification for Cont. Inpt.


Patient would decompensate in a less restrictive setting


Request HC Surrog/Guard Advoc?:  No











Ameya Delacruz DO Héctor 3, 2018 13:08

## 2018-06-04 VITALS
RESPIRATION RATE: 16 BRPM | OXYGEN SATURATION: 91 % | HEART RATE: 88 BPM | TEMPERATURE: 96.7 F | SYSTOLIC BLOOD PRESSURE: 168 MMHG | DIASTOLIC BLOOD PRESSURE: 67 MMHG

## 2018-06-04 VITALS
RESPIRATION RATE: 16 BRPM | DIASTOLIC BLOOD PRESSURE: 70 MMHG | TEMPERATURE: 97.3 F | OXYGEN SATURATION: 94 % | HEART RATE: 64 BPM | SYSTOLIC BLOOD PRESSURE: 132 MMHG

## 2018-06-04 VITALS — OXYGEN SATURATION: 92 %

## 2018-06-04 VITALS — OXYGEN SATURATION: 94 %

## 2018-06-04 RX ADMIN — IPRATROPIUM BROMIDE AND ALBUTEROL SULFATE SCH AMPULE: .5; 3 SOLUTION RESPIRATORY (INHALATION) at 20:37

## 2018-06-04 RX ADMIN — CARVEDILOL SCH MG: 12.5 TABLET, FILM COATED ORAL at 20:49

## 2018-06-04 RX ADMIN — ASPIRIN 81 MG SCH MG: 81 TABLET ORAL at 07:37

## 2018-06-04 RX ADMIN — ACETAMINOPHEN PRN MG: 325 TABLET ORAL at 20:53

## 2018-06-04 RX ADMIN — IPRATROPIUM BROMIDE AND ALBUTEROL SULFATE SCH AMPULE: .5; 3 SOLUTION RESPIRATORY (INHALATION) at 08:06

## 2018-06-04 RX ADMIN — CARVEDILOL SCH MG: 12.5 TABLET, FILM COATED ORAL at 07:38

## 2018-06-04 RX ADMIN — IPRATROPIUM BROMIDE AND ALBUTEROL SULFATE SCH AMPULE: .5; 3 SOLUTION RESPIRATORY (INHALATION) at 14:00

## 2018-06-04 RX ADMIN — BUDESONIDE AND FORMOTEROL FUMARATE DIHYDRATE SCH PUFF: 160; 4.5 AEROSOL RESPIRATORY (INHALATION) at 20:49

## 2018-06-04 RX ADMIN — LOSARTAN POTASSIUM SCH MG: 50 TABLET, FILM COATED ORAL at 07:38

## 2018-06-04 NOTE — HHI.PYPN
Subjective


Remarks


Patient seen in her room with her daughter and granddaughter, nurse Lance, and 

medical student the emilia, chart reviewed, patient complaint medications.  

Family states they feel the patient is close to her baseline behaviors.  

Patient is more alert focused, continues feisty, with the paranoia irritability 

and sarcasm are markedly softened.  Patient did have an episode of respiratory 

difficulty yesterday we will decrease the as needed Ativan to 0.25 mg twice 

daily in anticipation of weaning off the Ativan fairly quickly.  The patient's 

granddaughter and daughter are in agreement with this.  Then consider discharge 

within a few days





Review of Systems


Except as stated in HPI:  all other systems reviewed are Neg





Mental Status Examination


Appearance:  Appropriate


Consciousness:  Alert


Orientation:  Person, Place, Date/Time (Somewhat vague)


Motor Activity:  Other (Patient seen in bed)


Speech:  Pressured, Rapid


Language:  Adequate


Fund of Knowledge:  Adequate


Attention and Concentration:  Easily Distracted


Memory:  Impaired


Mood:  Irritable, Other (Euthymic)


Affect:  Other (Decreased range and intensity)


Thought Process & Associations:  Disorganized


Thought Content:  Bizarre thinking, Depersonalization


Hallucination Type:  None


Delusion Type:  Paranoid (Mildly)


Suicidal Ideation:  No


Suicidal Plan:  No


Suicidal Intention:  No


Homicidal Ideation:  No


Homicidal Plan:  No


Homicidal Intention:  No


Insight:  Poor


Judgment:  Poor





Results


Vitals/IOs





Vital Signs








  Date Time  Temp Pulse Resp B/P (MAP) Pulse Ox O2 Delivery O2 Flow Rate FiO2


 


6/4/18 08:07     92 Nasal Cannula 4.00 


 


6/4/18 06:02 96.7 88 16 168/67 (100)    














Intake and Output   


 


 6/4/18 6/4/18 6/5/18





 08:00 16:00 00:00


 


Intake Total 360 ml  


 


Balance 360 ml  











Assessment & Plan


Problem List:  


(1) Brief psychotic disorder


ICD Codes:  F23 - Brief psychotic disorder


Assessment & Plan


Estimated LOS:  days patient more alert focused, still some paranoia but 

overall doing better.  Patient's family members who are here through the 

session verify and agree with this.  She medication changes above upon further 

review I would adjust the diagnosis to brief psychotic disorder


Justification for Cont. Inpt.


At this time patient would decompensate the place to the lower level of care


Discharge Planning


Probable return home with family


Request HC Surrog/Guard Advoc?:  No











Carter Casey MD Jun 4, 2018 12:45

## 2018-06-04 NOTE — HHI.HP
Provisional Diagnosis


Admission Date


Jun 1, 2018 at 18:50


Axis I.


Unspecified psych





                               Certification of Person's Competence 


                           To Provide Express and Informed Consent





I have personally examined Becka Duffy , a person being served at 

[facility] on, Jun 4, 2018 16:14.


Express and informed consent means consent voluntarily given in writing, by a 

competent person, after sufficient explanation and disclosure of the subject 

matter involved to enable the person to make a knowing and willful decision 

without any element of force, fraud, deceit, duress, or other form of 

constraint or coercion.





This person is 18 years of age or older, is not now known to be incompetent to 

consent to treatment with a guardian advocate, and does not have a health care 

surrogate or proxy currently making medical treatment decisions.  I have found 

this person to be one of the following:





[] Competent to provide express and informed consent, as defined above, for 

voluntary admission to this facility and is competent to provide express and 

informed consent for treatment.  He/she has the consistent capacity to make 

well reasoned, willful, and knowing decisions concerning his or her medical or 

mental health treatment.  The person fully and consistently understands the 

purpose of the admission for examination/placement and is fully capable of 

personally exercising all rights [place]





[] Incompetent to provide express and informed consent to voluntary admission, 

and this is incompetent to provide express and informed consent to treatment.  

The person must be transferred to involuntary status and a petition for a 

guardian advocate filed with the Circuit Court.





[x] Refusing to provide express and informed consent to voluntary admission but 

is competent to provide express and informed consent for treatment.  The person 

must be discharged or transferred to involuntary status.





Form shall be completed within 24 hours of a person's arrival at the receiving 

facility and filed in the clinical record of each person:


1. Admitted on a voluntary basis


2. Permitted to provide express and informed consent to his/her own treatment


3. Allowed to transfer from involuntary to voluntary status


4. Prior to permitting a person to consent to his or her own treatment after 

having been previously found incompetent to consent to treatment.





History of Present Illness


Capacity:  Has Capacity


HPI


Patient was seen June 1, 2018.  This is a late entry





The patient is a 87-year-old  woman, domiciled with her nephew in 

[place], , mother of 2 kids, supported by Social Security, with 

psychiatric history of anxiety, she is on Xanax 0.5 mg twice daily prescribed 

by PCP, no previous psychiatric hospitalizations, no previous suicide attempts, 

with a medical history of COPD, hypertension, who presents the emergency room 

for evaluation of altered mental status.  As per patient's daughter, patient 

was at home with family members.  Reports that patient she began to become 

paranoid last night. Reports that she is convinced that her nephew's wife whom 

she lives with is trying to kill her. Reports that she called the  last 

night around 3am and had them follow her while she drove to her daughter's home 

in [place].  Reports that patient has never acted like this in the past. 

Patient with no psych history.  She has being admitted under observation due to 

Acute delirium. Head CT was negative for any acute changes. Pt had a 

questionable UTI with a slightly abnormal UA and was given a dose of Rocephin 

in the ED otherwise her labs were stable.  Consulted to psychiatry to address 

psychosis.  On psychiatric evaluation I find the patient is very talkative, at 

times pressure, difficult to redirect.  Patient presents with very labile mood, 

mood swings, emotional incontinence.  She received me crying profusely, stating 

that her family is plotting to put her in the hospital.  The patient states 

that she feels very guilty "because I done something very terrible to my family

, I called the police because I know something wrong was happening to my 

daughter, but now she is upset with me".  She also reports that several member 

of her family and lying to and they want to call the police on her.  The 

patient has to be redirected multiple times during the evaluation due to her 

pressure speech.  The patient is fully oriented 3.  She knows who is the 

 she denies the use of illegal drugs and alcohol.

  Her daughter June Beer, he states that the patient is acting out of character 

for the last week.  He says that the patient has been talking to herself, 

making a lot of accusations with no based in the reality, and acting really 

bizarre.  For example, 2 nights ago she called the police reporting that her 

daughter was in danger made the police to go to her daughter's house, but 

really nothing was happening.  She also has been sleeping poorly, "and talking 

even when she is asleep".





Review of Systems


Constitutional:  DENIES: Diaphoretic episodes, Fatigue, Fever, Weight gain, 

Weight loss, Chills, Dizziness, Change in appetite, Night Sweats


Endocrine:  DENIES: Abnorml menstrual pattern, Heat/cold intolerance, Polydipsia

, Polyuria, Polyphagia


Eyes:  DENIES: Blurred vision, Diplopia, Eye inflammation, Eye pain, Vision loss

, Photosensitivity, Double Vision


Ears, nose, mouth, throat:  DENIES: Tinnitus, Hearing loss, Vertigo, Nasal 

discharge, Oral lesions, Throat pain, Hoarseness, Ear Pain, Running Nose, 

Epistaxis, Sinus Pain, Toothache, Odynophagia


Respiratory:  DENIES: Apneas, Cough, Snoring, Wheezing, Hemoptysis, Sputum 

production, Shortness of breath


Cardiovascular:  DENIES: Chest pain, Palpitations, Syncope, Dyspnea on Exertion

, PND, Lower Extremity Edema, Orthopnea, Claudication


Gastrointestinal:  DENIES: Abdominal pain, Black stools, Bloody stools, 

Constipation, Diarrhea, Nausea, Vomiting, Difficulty Swallowing, Anorexia


Genitourinary:  DENIES: Abnormal vaginal bleeding, Dysmenorrhea, Dyspareunia, 

Sexual dysfunction, Urinary frequency, Urinary incontinence, Urgency, Hematuria

, Dysuria, Nocturia, Vaginal discharge


Musculoskeletal:  DENIES: Joint pain, Muscle aches, Stiffness, Joint Swelling, 

Back pain, Neck pain


Integumentary:  DENIES: Abnormal pigmentation, Pruritus, Rash, Nail changes, 

Breast masses, Breast skin changes, Nipple discharge


Hematologic/lymphatic:  DENIES: Bruising, Lymphadenopathy


Immunologic/allergic:  DENIES: Eczema, Urticaria


Neurologic:  DENIES: Abnormal gait, Headache, Localized weakness, Paresthesias, 

Seizures, Speech Problems, Tremor, Poor Balance


Psychiatric:  DENIES: Anxiety, Confusion, Mood changes, Depression, 

Hallucinations, Agitation, Suicidal Ideation, Homicidal Ideation, Delusions





Past Family Social History


Coded Allergies:  


     Sulfa (Sulfonamide Antibiotics) (Unverified  Allergy, Severe, Rash, 5/8/18)


     cilostazol (Unverified  Allergy, Severe, Itching, 5/8/18)


     niacin (Unverified  Allergy, Severe, Rash, 5/8/18)


     sulfisoxazole (Unverified  Allergy, Severe, Rash, 5/8/18)


Active Scripts


[Albuterol-Ipratropium Neb] 1 AMPULE NEBU No Conflict Check, 1 AMPULE NEB Q6HR 

WHILE AWAKE NEB for copd for 5 Days


   Prov:Sulma Jackson         6/1/18


Reported Medications


Budesonide-Formoterol Inh (Symbicort Inh) 160-4.5 Mcg/Act Aero, 2 PUFF INH Q12HR

, #1 INHALER 0 Refills


   5/31/18


Losartan (Losartan) 50 Mg Tab, 50 MG PO DAILY for Blood Pressure Management, #

30 TAB 0 Refills


   5/30/18


Hydrochlorothiazide (Hydrochlorothiazide) 25 Mg Tab, 25 MG PO DAILY, #30 TAB 0 

Refills


   5/30/18


Tramadol (Tramadol) 50 Mg Tab, 50 MG PO Q4H Y for CRAMPS, TAB 0 Refills


   5/8/18


Cyanocobalamin (Vitamin B-12) 1,000 Mcg Subl, 1000 MCG SL DAILY for Nutritional 

Supplement, TAB.SL 0 Refills


   5/8/18


Albuterol 18 GM Inh (Ventolin Hfa 18 GM Inh) 90 Mcg/Act Aer, 2 PUFF INH Q6H Y 

for SHORTNESS OF BREATH, #1 INHALER 0 Refills


   3/4/18


Temazepam (Temazepam) 15 Mg Cap, 15 MG PO HS Y for INSOMNIA, #30 CAP 0 Refills


   3/4/18


Multiple Vitamin (Multiple Vitamin) 1 Tab, 1 TAB PO DAILY for Nutritional 

Supplement, TAB 0 Refills


   3/4/18


Lovastatin (Lovastatin) 40 Mg Tab, 80 MG PO DAILY for Cholesterol Management, #

60 TAB 0 Refills


   3/4/18


Carvedilol (Carvedilol) 12.5 Mg Tab, 12.5 MG PO BID, #60 TAB 0 Refills


   3/4/18


Aspirin (Aspirin Children's) 81 Mg Chew, 81 MG CHEW DAILY, TAB 0 Refills


   3/4/18


Alprazolam (Alprazolam) 0.5 Mg Tab, 0.5 MG PO BID Y for ANXIETY, TAB 0 Refills


   3/4/18


Discontinued Reported Medications


Promethazine-Codeine Liq (Promethazine-Codeine Liq) 6.25-10 Mg/5 Ml Syrp, 10 ML 

PO BID Y for COUGH AND/OR COLD SYMPTOMS, ML 0 Refills


   5/30/18


Albuterol Neb (Albuterol Neb) 2.5 Mg/0.5 Ml Neb, 2.5 MG NEB DAILY for Breathing 

Treatment, #120 NEBULE 0 Refills


   Note: The Albuterol Sulfate Inhalation Solution is concentrated and


   must be diluted. Read complete instructions carefully before using.


   5/30/18


[Ipratropium Bromide] (Ipratropium Bromide)   No Conflict Check


   3/4/18





Current Medications








 Medications


  (Trade)  Dose


 Ordered  Sig/Vic


 Route  Start Time


 Stop Time Status Last Admin


 


  (Tylenol)  650 mg  Q4H  PRN


 PO  6/1/18 21:15


    6/3/18 08:59


 


 


  (Milk Of


 Magnesia Liq)  30 ml  DAILY  PRN


 PO  6/1/18 21:15


     


 


 


  (Mag-Al Plus


 Susp Liq)  30 ml  Q6H  PRN


 PO  6/1/18 21:15


     


 


 


  (Duoneb Neb)  1 ampule  Q6HR WHILE AWAKE  NEB


 NEB  6/2/18 08:00


    6/4/18 08:06


 


 


  (Duoneb Neb)  1 ampule  Q2HR NEB  PRN


 NEB  6/2/18 07:15


    6/3/18 18:02


 


 


  (Coreg)  12.5 mg  Q12HR


 PO  6/2/18 09:00


    6/4/18 07:38


 


 


  (Cozaar)  50 mg  DAILY


 PO  6/2/18 10:00


    6/4/18 07:38


 


 


  (Aspirin Chew)  81 mg  DAILY


 CHEW  6/2/18 09:00


    6/4/18 07:37


 


 


  (SEROquel)  12.5 mg  BID


 PO  6/2/18 21:00


    6/4/18 07:38


 


 


  (Pill Splitter)  1 ea  UNSCH  PRN


 OTHER  6/2/18 16:45


     


 


 


  (Ativan)  0.25 mg  Q12H  PRN


 PO  6/4/18 12:45


     


 











Physical Exam


Vital Signs





Vital Signs








  Date Time  Temp Pulse Resp B/P (MAP) Pulse Ox O2 Delivery O2 Flow Rate FiO2


 


6/4/18 08:07     92 Nasal Cannula 4.00 


 


6/4/18 06:02 96.7 88 16 168/67 (100)    














I/O   


 


 6/4/18 6/4/18 6/5/18





 08:00 16:00 00:00


 


Intake Total 360 ml 240 ml 


 


Balance 360 ml 240 ml 











Mental Status Examination


Appearance:  Appropriate


Consciousness:  Alert


Orientation:  Person, Place, Date/Time (Somewhat vague)


Motor Activity:  Other (Patient seen in bed)


Speech:  Pressured, Rapid


Language:  Adequate


Fund of Knowledge:  Adequate


Attention and Concentration:  Easily Distracted


Memory:  Impaired


Mood:  Irritable, Other (Euthymic)


Affect:  Other (Decreased range and intensity)


Thought Process & Associations:  Disorganized


Thought Content:  Bizarre thinking, Depersonalization


Hallucination Type:  None


Delusion Type:  Paranoid (Mildly)


Suicidal Ideation:  No


Suicidal Plan:  No


Suicidal Intention:  No


Homicidal Ideation:  No


Homicidal Plan:  No


Homicidal Intention:  No


Insight:  Poor


Judgment:  Poor





Assessment & Plan


Problem List:  


(1) Brief psychotic disorder


ICD Codes:  F23 - Brief psychotic disorder


Assessment & Plan:  On psychiatric evaluation today I find a patient that is 

very labile, emotionally incontinent, talkative, at times pressure, difficult 

to redirect, with prominent paranoia, internally preoccupied for the last week, 

as per daughter.  The daughter also reports poor sleep, paranoid delusions at 

home and self talking.  This is a patient with a psychiatry history just of 

anxiety, no previous psychiatric hospitalizations, no previous suicide attempts

, she has been using Xanax 0.5 mg twice daily, but no previous episodes of 

psychosis or veronica.  The patient denies the use of alcohol and illegal drugs.  

She is fully oriented 3, with attention deficit, but not fluctuation of 

consciousness.  Current presentation seems to be consistent with new onset 

psychosis that could be related with underlying medical conditions, such as a 

UTI for example.  A primary psychotic process is less probable to start at this 

age.  Given the level of psychosis and disruption in patient functionality the 

patient needs psychiatric hospitalization for stabilization.  Patient has been 

Baker acted due to her risk of danger to self and others.  Will be started in 

Seroquel 12.5 mg twice daily.  She might benefit of a mood stabilizer such as 

Depakote.  Continue Xanax 0.5 mg twice daily.  Transferred the patient to med 

psych once medically stable





Assessment & Plan


Estimated LOS:  days


Request HC Surrog/Guard Advoc?:  No











Bobby Hoffman MD Jun 4, 2018 16:15

## 2018-06-04 NOTE — PD.TTN
Patient Problems


1. Discharge planning


2. Medication compliance


3. Knowledge deficit


4. Lack of coping skills





Progress Toward Goals


Provider Present:  Dr. EBONY Casey


Provider Input:  


new patient, pt currently meets criteria for psychiatry, pt is compliant


with medications.


Psychiatric Counselors Present:  Other


Psych Therapist Input:  


Alin; no input - new patient


Group Spec/RT/OT/BORJA Present:  Neptali Mari OT


Group Spec/RT/OT/BORJA Input:  


new patient; no hx of group attendance.





Documentation


Scribe:  neptali mari ms, otr











Neptali Mari Jun 4, 2018 10:10

## 2018-06-04 NOTE — HHI.PR
Subjective


Remarks


Pt complains about not getting her Symbicort


Daughter at the bedside reports that the pt is not moving around much.





Objective


Vitals





Vital Signs








  Date Time  Temp Pulse Resp B/P (MAP) Pulse Ox O2 Delivery O2 Flow Rate FiO2


 


6/4/18 08:07     92 Nasal Cannula 4.00 


 


6/4/18 06:02 96.7 88 16 168/67 (100) 91   


 


6/3/18 19:58     93 Nasal Cannula 4.00 














 6/4/18 6/4/18 6/5/18





 15:00 23:00 07:00


 


Intake Total 1200 ml 240 ml 


 


Balance 1200 ml 240 ml 


 


   


 


Intake Oral 1200 ml 240 ml 








Result Diagram:  


6/2/18 0620





Objective Remarks


General: NAD, Awake and alert


Chest: Air movement bilaterally


Cardiac: Regular


Abd: +BS, soft ND/NT


Ext: No edema





A/P


Problem List:  


(1) Psychosis


ICD Codes:  F29 - Unspecified psychosis not due to a substance or known 

physiological condition


Status:  Acute


Plan:  


1. psychosis. 


   - Pt presented emotionally labile with pressured speech and agitated


   - She has has been tapering her own Xanax at home for CBD oil. 


   - No obvious metabolic or infectious etiology found for mental status 

changes. 


   - CT head negative for acute cva


   - Psychiatry managing her agitation/psychosis.


   - Pt started on Seroquel 12.5mg po BID


   - Ativan PRN





2. COPD with mild flare


   - cont scheduled nebs for COPD. 


   - Resume her Symbicort


   - oxygen as needed and try to wean down and off O2. Pt was not on O2 prior 

to this admission


   - PT evaluation in AM





3. CAD, stable





4. HTN


   - Cont home bp meds. 


   - Hold HCTZ today and resume as tolerated








(2) COPD (chronic obstructive pulmonary disease)


ICD Codes:  J44.9 - Chronic obstructive pulmonary disease, unspecified


Status:  Acute


(3) CAD (coronary artery disease)


ICD Codes:  I25.10 - Atherosclerotic heart disease of native coronary artery 

without angina pectoris


Status:  Chronic


(4) HTN (hypertension)


ICD Codes:  I10 - Essential (primary) hypertension


Status:  Chronic


Assessment and Plan


Patient examined.


Assessment and plan formulated with Sulma Jackson PA-C.


I agree with the above.





rare scattered b/l wheeze on lung auscultation. 


Resume symbicort. 


continue scheduled and prn duonebs. 


Request Physical therapy evaluation.











Sulma Jackson Jun 4, 2018 18:14


Zachery Bell DO Jun 5, 2018 09:24

## 2018-06-05 VITALS
RESPIRATION RATE: 20 BRPM | DIASTOLIC BLOOD PRESSURE: 80 MMHG | SYSTOLIC BLOOD PRESSURE: 167 MMHG | OXYGEN SATURATION: 96 % | TEMPERATURE: 97.6 F | HEART RATE: 85 BPM

## 2018-06-05 VITALS — OXYGEN SATURATION: 95 %

## 2018-06-05 VITALS
OXYGEN SATURATION: 93 % | TEMPERATURE: 97.7 F | DIASTOLIC BLOOD PRESSURE: 78 MMHG | SYSTOLIC BLOOD PRESSURE: 180 MMHG | HEART RATE: 83 BPM | RESPIRATION RATE: 15 BRPM

## 2018-06-05 VITALS — OXYGEN SATURATION: 93 %

## 2018-06-05 VITALS — OXYGEN SATURATION: 92 %

## 2018-06-05 RX ADMIN — IPRATROPIUM BROMIDE AND ALBUTEROL SULFATE SCH AMPULE: .5; 3 SOLUTION RESPIRATORY (INHALATION) at 14:25

## 2018-06-05 RX ADMIN — BUDESONIDE AND FORMOTEROL FUMARATE DIHYDRATE SCH PUFF: 160; 4.5 AEROSOL RESPIRATORY (INHALATION) at 22:15

## 2018-06-05 RX ADMIN — CARVEDILOL SCH MG: 12.5 TABLET, FILM COATED ORAL at 22:16

## 2018-06-05 RX ADMIN — ASPIRIN 81 MG SCH MG: 81 TABLET ORAL at 08:35

## 2018-06-05 RX ADMIN — BUDESONIDE AND FORMOTEROL FUMARATE DIHYDRATE SCH PUFF: 160; 4.5 AEROSOL RESPIRATORY (INHALATION) at 08:36

## 2018-06-05 RX ADMIN — IPRATROPIUM BROMIDE AND ALBUTEROL SULFATE SCH AMPULE: .5; 3 SOLUTION RESPIRATORY (INHALATION) at 08:17

## 2018-06-05 RX ADMIN — LOSARTAN POTASSIUM SCH MG: 50 TABLET, FILM COATED ORAL at 08:35

## 2018-06-05 RX ADMIN — ACETAMINOPHEN PRN MG: 325 TABLET ORAL at 06:41

## 2018-06-05 RX ADMIN — IPRATROPIUM BROMIDE AND ALBUTEROL SULFATE SCH AMPULE: .5; 3 SOLUTION RESPIRATORY (INHALATION) at 20:24

## 2018-06-05 RX ADMIN — CARVEDILOL SCH MG: 12.5 TABLET, FILM COATED ORAL at 08:36

## 2018-06-05 NOTE — HHI.PR
Subjective


Remarks


Pt pleasant but agitated at times.





Objective


Vitals





Vital Signs








  Date Time  Temp Pulse Resp B/P (MAP) Pulse Ox O2 Delivery O2 Flow Rate FiO2


 


6/5/18 14:35     93 Nasal Cannula 6.00 


 


6/5/18 08:20     95 Nasal Cannula 5.00 


 


6/5/18 07:41   22     


 


6/5/18 06:00 97.7 83 15 180/78 (112) 93   


 


6/4/18 20:39     94 Nasal Cannula 3.00 


 


6/4/18 18:00 97.3 64 16 132/70 (90) 94   














 6/5/18 6/5/18 6/6/18





 15:00 23:00 07:00


 


Intake Total 100 ml 240 ml 


 


Balance 100 ml 240 ml 


 


   


 


Intake Oral 100 ml 240 ml 








Result Diagram:  


6/2/18 0620





Objective Remarks


General: NAD, Awake and alert


Chest: Air movement bilaterally


Cardiac: Regular


Abd: +BS, soft ND/NT


Ext: No edema





A/P


Problem List:  


(1) Psychosis


ICD Codes:  F29 - Unspecified psychosis not due to a substance or known 

physiological condition


Status:  Acute


Plan:  


1. psychosis. 


   - Pt presented emotionally labile with pressured speech and agitated


   - She has has been tapering her own Xanax at home for CBD oil. 


   - No obvious metabolic or infectious etiology found for mental status 

changes. 


   - CT head negative for acute cva


   - Psychiatry managing her agitation/psychosis.


   - Pt started on Seroquel 12.5mg po BID


   - Ativan PRN





2. COPD with mild flare


   - cont scheduled nebs for COPD. 


   - Resume her Symbicort


   - oxygen as needed and try to wean down and off O2. Pt was not on O2 prior 

to this admission


   - continue PT


   - Pt would benefit from SNF at the conclusion of current hospitalization. 





3. CAD, stable





4. HTN


   - stable


   - coreg





(2) COPD (chronic obstructive pulmonary disease)


ICD Codes:  J44.9 - Chronic obstructive pulmonary disease, unspecified


Status:  Acute


(3) CAD (coronary artery disease)


ICD Codes:  I25.10 - Atherosclerotic heart disease of native coronary artery 

without angina pectoris


Status:  Chronic


(4) HTN (hypertension)


ICD Codes:  I10 - Essential (primary) hypertension


Status:  Chronic


Assessment and Plan


Patient examined.


Assessment and plan formulated with Sulma Jackson PA-C.


I agree with the above.





rare scattered b/l wheeze on lung auscultation. 


Resume symbicort. 


continue scheduled and prn duonebs. 


Request Physical therapy evaluation.











Zachery Bell DO Jun 5, 2018 16:37

## 2018-06-05 NOTE — HHI.PYPN
Subjective


Remarks


Patient seen in her room with RN and with medical student Roz, chart reviewed 

patient discussed with nurse, patient compliant medications.  Patient continues 

calm cooperative his processing well issues related to discharge to her family.

  We need to get medical clearance for discharge.  Patient is tolerating the 

decrease in benzodiazepines without problems.  For now continue treatment





Review of Systems


Except as stated in HPI:  all other systems reviewed are Neg





Mental Status Examination


Appearance:  Appropriate


Consciousness:  Alert


Orientation:  Person, Place, Date/Time (Somewhat vague)


Motor Activity:  Other (Patient seen in bed)


Speech:  Pressured, Rapid


Language:  Adequate


Fund of Knowledge:  Adequate


Attention and Concentration:  Adequate


Memory:  Impaired


Mood:  Other (Euthymic)


Affect:  Other (Decreased range and intensity)


Thought Process & Associations:  Disorganized (Markedly improved)


Thought Content:  Bizarre thinking


Hallucination Type:  None


Delusion Type:  Paranoid (Mildly)


Suicidal Ideation:  No


Suicidal Plan:  No


Suicidal Intention:  No


Homicidal Ideation:  No


Homicidal Plan:  No


Homicidal Intention:  No


Insight:  Poor


Judgment:  Poor





Results


Vitals/IOs





Vital Signs








  Date Time  Temp Pulse Resp B/P (MAP) Pulse Ox O2 Delivery O2 Flow Rate FiO2


 


6/5/18 08:20     95 Nasal Cannula 5.00 


 


6/5/18 06:00 97.7 83 15 180/78 (112)    














Intake and Output   


 


 6/5/18 6/5/18 6/6/18





 08:00 16:00 00:00


 


Intake Total 120 ml 100 ml 


 


Balance 120 ml 100 ml 











Assessment & Plan


Problem List:  


(1) Brief psychotic disorder


ICD Codes:  F23 - Brief psychotic disorder


Assessment & Plan


Estimated LOS:  days patient continues to improve, she still is cooperative is 

anticipating discharge.  We need to get medical clearance for discharge for 

today or tomorrow.


Justification for Cont. Inpt.


We are awaiting medical clearance for discharge


Request HC Surrog/Guard Advoc?:  No











Carter Casey MD Jun 5, 2018 08:44

## 2018-06-06 VITALS
OXYGEN SATURATION: 98 % | RESPIRATION RATE: 16 BRPM | SYSTOLIC BLOOD PRESSURE: 127 MMHG | DIASTOLIC BLOOD PRESSURE: 60 MMHG | HEART RATE: 74 BPM | TEMPERATURE: 97.7 F

## 2018-06-06 VITALS
RESPIRATION RATE: 16 BRPM | HEART RATE: 66 BPM | DIASTOLIC BLOOD PRESSURE: 63 MMHG | SYSTOLIC BLOOD PRESSURE: 127 MMHG | OXYGEN SATURATION: 94 % | TEMPERATURE: 97.8 F

## 2018-06-06 VITALS — OXYGEN SATURATION: 96 %

## 2018-06-06 VITALS — OXYGEN SATURATION: 92 %

## 2018-06-06 LAB
BASOPHILS # BLD AUTO: 0.1 TH/MM3 (ref 0–0.2)
BASOPHILS NFR BLD: 0.5 % (ref 0–2)
BUN SERPL-MCNC: 20 MG/DL (ref 7–18)
CALCIUM SERPL-MCNC: 8.8 MG/DL (ref 8.5–10.1)
CHLORIDE SERPL-SCNC: 105 MEQ/L (ref 98–107)
CREAT SERPL-MCNC: 0.82 MG/DL (ref 0.5–1)
EOSINOPHIL # BLD: 0.1 TH/MM3 (ref 0–0.4)
EOSINOPHIL NFR BLD: 1.5 % (ref 0–4)
ERYTHROCYTE [DISTWIDTH] IN BLOOD BY AUTOMATED COUNT: 15 % (ref 11.6–17.2)
GFR SERPLBLD BASED ON 1.73 SQ M-ARVRAT: 66 ML/MIN (ref 89–?)
GLUCOSE SERPL-MCNC: 99 MG/DL (ref 74–106)
HCO3 BLD-SCNC: 32.1 MEQ/L (ref 21–32)
HCT VFR BLD CALC: 31.4 % (ref 35–46)
HGB BLD-MCNC: 10.1 GM/DL (ref 11.6–15.3)
LYMPHOCYTES # BLD AUTO: 0.8 TH/MM3 (ref 1–4.8)
LYMPHOCYTES NFR BLD AUTO: 8.2 % (ref 9–44)
MCH RBC QN AUTO: 28.2 PG (ref 27–34)
MCHC RBC AUTO-ENTMCNC: 32.2 % (ref 32–36)
MCV RBC AUTO: 87.6 FL (ref 80–100)
MONOCYTE #: 1.2 TH/MM3 (ref 0–0.9)
MONOCYTES NFR BLD: 12.1 % (ref 0–8)
NEUTROPHILS # BLD AUTO: 7.8 TH/MM3 (ref 1.8–7.7)
NEUTROPHILS NFR BLD AUTO: 77.7 % (ref 16–70)
PLATELET # BLD: 321 TH/MM3 (ref 150–450)
PMV BLD AUTO: 7.9 FL (ref 7–11)
RBC # BLD AUTO: 3.58 MIL/MM3 (ref 4–5.3)
SODIUM SERPL-SCNC: 144 MEQ/L (ref 136–145)
WBC # BLD AUTO: 10 TH/MM3 (ref 4–11)

## 2018-06-06 RX ADMIN — LOSARTAN POTASSIUM SCH MG: 50 TABLET, FILM COATED ORAL at 08:40

## 2018-06-06 RX ADMIN — BUDESONIDE AND FORMOTEROL FUMARATE DIHYDRATE SCH PUFF: 160; 4.5 AEROSOL RESPIRATORY (INHALATION) at 21:36

## 2018-06-06 RX ADMIN — IPRATROPIUM BROMIDE AND ALBUTEROL SULFATE SCH AMPULE: .5; 3 SOLUTION RESPIRATORY (INHALATION) at 07:23

## 2018-06-06 RX ADMIN — CARVEDILOL SCH MG: 12.5 TABLET, FILM COATED ORAL at 08:40

## 2018-06-06 RX ADMIN — ACETAMINOPHEN PRN MG: 325 TABLET ORAL at 17:53

## 2018-06-06 RX ADMIN — CARVEDILOL SCH MG: 12.5 TABLET, FILM COATED ORAL at 21:31

## 2018-06-06 RX ADMIN — IPRATROPIUM BROMIDE AND ALBUTEROL SULFATE SCH AMPULE: .5; 3 SOLUTION RESPIRATORY (INHALATION) at 19:22

## 2018-06-06 RX ADMIN — ACETAMINOPHEN PRN MG: 325 TABLET ORAL at 21:31

## 2018-06-06 RX ADMIN — ASPIRIN 81 MG SCH MG: 81 TABLET ORAL at 08:42

## 2018-06-06 RX ADMIN — BUDESONIDE AND FORMOTEROL FUMARATE DIHYDRATE SCH PUFF: 160; 4.5 AEROSOL RESPIRATORY (INHALATION) at 09:00

## 2018-06-06 NOTE — MB
cc:

SARABJIT Das MD, Edward B DO

****

 

 

DATE:

06/06/2018

 

HISTORY OF PRESENT ILLNESS:

Ms. Duffy is an 87-year-old white female with a known history of 

COPD dating back over a decade.  She quit smoking about 15 years ago, 

but had smoked all of her adult life until then.  She uses Symbicort 

at home and apparently has no significant complaints with regard to 

this.  She was, however, admitted with an acute bronchitis and a mild 

exacerbation, 05/08.  Chest CT at that time revealed some scattered 

scarring, but no consolidation and underlying emphysema.  Arterial 

blood gases on room air at that, pO2 was 46, pH 7.38 and pCO2 of 47.  

She takes Symbicort at home 160 two puffs twice a day and has a 

nebulizer with albuterol and Atrovent to use p.r.n.  I am not aware of

whether or not she has oxygen at home.

 

The patient is pleasant, but a bit confused, intermittently agitated 

and was admitted to psychiatry at this point for an acute psychosis.  

Etiology is unclear, but she has had some difficulty with oxygenation 

during this hospitalization and has required 4-6 liters to maintain 

adequate saturations.  I have asked to see her with regard to her COPD

and hypoxemia.

 

Chest x-ray on 06/01 revealed cardiomegaly, mild interstitial edema, a

pacemaker device in place, but no significant pleural effusions, no 

obvious infiltrates suggestive of infection.  The only laboratory that

she has had is a complete metabolic profile.  BUN is 20 with a 

creatinine of 0.7.  Other electrolytes are acceptable.

 

The patient's history is unreliable as she is a bit confused at 

present, but she is certainly comfortable at rest.  She is focused on 

the oxygen and insists that there is something wrong with the oxygen 

delivery system.  That has been checked by the nursing staff.  I do 

not see any problem with it at the time of this exam.

 

PAST MEDICAL HISTORY:

She has an extensive past medical history including hypertension, 

coronary artery disease, post-stents, cardiomyopathy.  An AICD 

pacemaker has been placed.  She has polyarthritis, a history of C. 

difficile colitis, prior appendectomy, kyphoplasty of T7 for 

osteoporotic fracture and previous abdominal surgeries for small-bowel

obstruction.

 

ALLERGIES:

SULFA, NIACIN, SULFISOXAZOLE AND CILOSTAZOL.

 

CURRENT MEDICATIONS:

Reviewed in the EMR.

 

REVIEW OF SYSTEMS:

The patient is really not complaining at the present time of any chest

pain, not concerned about shortness of breath.  No cough or congestion

has been noted.  She has no chronic edema.  No recent change in bowel 

habits that she relates.

 

PHYSICAL EXAMINATION:

GENERAL:  Elderly white female.

VITAL SIGNS:  Afebrile.  Blood pressure 120/60, pulse is 70, 

respirations are 16-18, nonlabored, with an O2 saturation currently on

a partial nonrebreather of 96%.

HEENT:  Sclerae are anicteric.  Mucous membranes are moist.

NECK:  Veins are not distended.

RESPIRATORY:  She does have few fine basilar rales; otherwise, no 

congestion or wheezing.

HEART:  Regular rhythm.  Soft systolic murmur.  No audible S3.

EXTREMITIES:  No peripheral edema or calf tenderness.  Some bruising 

of the skin on upper and lower extremities, which is apparently 

chronic.

 

DISCUSSION:

Ms. Duffy has a history of chronic obstructive pulmonary disease. 

She was hypoxic on a previous admission with a room air pO2 of 46.  

Difficult to get much historical information to help at present, but 

chest x-ray on admission did reveal some interstitial edema and she 

does have a significant cardiac history, so this may be mild 

congestive heart failure superimposed on significant COPD.

 

PLAN:

We will continue her Symbicort and nebulized aerosol treatments 3 

times a day.  I have ordered an arterial blood gas and a followup 

chest x-ray as well as followup electrolytes and a BNP.

 

Further diagnostic and/or therapeutic intervention will depend on her 

response and ongoing clinical course.

 

 

__________________________________

RMD LIANE Patiño/CAROLA

D: 06/06/2018, 01:06 PM

T: 06/06/2018, 01:42 PM

Visit #: [id number]

Job #: [id number]

## 2018-06-06 NOTE — HHI.PYPN
Subjective


Remarks


Patient seen in her room with nurse Lance, chart review, patient complaint 

medications, patient discussed with nurse.  Patient alert oriented calm 

cooperative now he has oxygen by mask, nurses starting peripheral IV at this 

patient.  It appears medicine services for inpatient and outflow she was 

showing some more signs of congestive heart failure of there initiating 

treatment of that.  Patient is aware that she appears to be coping with it 

right now.  Patient is scheduled for [facility] tomorrow.  We will allow that 

to continue at this time.





Review of Systems


Except as stated in HPI:  all other systems reviewed are Neg





Mental Status Examination


Appearance:  Appropriate


Consciousness:  Alert


Orientation:  Person, Place, Date/Time (Somewhat vague)


Motor Activity:  Other (Patient seen in bed)


Speech:  Pressured, Rapid


Language:  Adequate


Fund of Knowledge:  Adequate


Attention and Concentration:  Adequate


Memory:  Impaired


Mood:  Other (Euthymic)


Affect:  Other (Decreased range and intensity)


Thought Process & Associations:  Disorganized (Markedly improved)


Thought Content:  Bizarre thinking


Hallucination Type:  None


Delusion Type:  Paranoid (Mildly)


Suicidal Ideation:  No


Suicidal Plan:  No


Suicidal Intention:  No


Homicidal Ideation:  No


Homicidal Plan:  No


Homicidal Intention:  No


Insight:  Poor


Judgment:  Poor





Results


Labs











Test


  6/6/18


14:00 6/6/18


14:25


 


White Blood Count 10.0 TH/MM3  


 


Red Blood Count 3.58 MIL/MM3  


 


Hemoglobin 10.1 GM/DL  


 


Hematocrit 31.4 %  


 


Mean Corpuscular Volume 87.6 FL  


 


Mean Corpuscular Hemoglobin 28.2 PG  


 


Mean Corpuscular Hemoglobin


Concent 32.2 % 


  


 


 


Red Cell Distribution Width 15.0 %  


 


Platelet Count 321 TH/MM3  


 


Mean Platelet Volume 7.9 FL  


 


Neutrophils (%) (Auto) 77.7 %  


 


Lymphocytes (%) (Auto) 8.2 %  


 


Monocytes (%) (Auto) 12.1 %  


 


Eosinophils (%) (Auto) 1.5 %  


 


Basophils (%) (Auto) 0.5 %  


 


Neutrophils # (Auto) 7.8 TH/MM3  


 


Lymphocytes # (Auto) 0.8 TH/MM3  


 


Monocytes # (Auto) 1.2 TH/MM3  


 


Eosinophils # (Auto) 0.1 TH/MM3  


 


Basophils # (Auto) 0.1 TH/MM3  


 


CBC Comment DIFF FINAL  


 


Differential Comment   


 


Blood Urea Nitrogen 20 MG/DL  


 


Creatinine 0.82 MG/DL  


 


Random Glucose 99 MG/DL  


 


Calcium Level 8.8 MG/DL  


 


Sodium Level 144 MEQ/L  


 


Potassium Level 4.7 MEQ/L  


 


Chloride Level 105 MEQ/L  


 


Carbon Dioxide Level 32.1 MEQ/L  


 


Anion Gap 7 MEQ/L  


 


Estimat Glomerular Filtration


Rate 66 ML/MIN 


  


 


 


Blood Gas Puncture Site  LT BRACHIAL 


 


Blood Gas Patient Temperature  98.6 


 


Blood Gas HCO3  33 mmol/L 


 


Blood Gas Base Excess  7.6 mmol/L 


 


Blood Gas Oxygen Saturation  88 % 


 


Arterial Blood pH  7.37 


 


Arterial Blood Partial


Pressure CO2 


  59 mmHg 


 


 


Arterial Blood Partial


Pressure O2 


  59 mmHg 


 


 


Arterial Blood Oxygen Content  15.7 Vol % 


 


Arterial Blood


Carboxyhemoglobin 


  0.8 % 


 


 


Arterial Blood Methemoglobin  1.3 % 


 


Blood Gas Hemoglobin  12.7 G/DL 


 


Oxygen Delivery Device  PRB 








Vitals/IOs





Vital Signs








  Date Time  Temp Pulse Resp B/P (MAP) Pulse Ox O2 Delivery O2 Flow Rate FiO2


 


6/6/18 12:41     96 Partial Rebreather  


 


6/6/18 05:39 97.8 66 16 127/63 (84)    


 


6/5/18 20:23       4.00 














Intake and Output   


 


 6/6/18 6/6/18 6/7/18





 08:00 16:00 00:00


 


Intake Total 200 ml  


 


Balance 200 ml  











Assessment & Plan


Problem List:  


(1) Brief psychotic disorder


ICD Codes:  F23 - Brief psychotic disorder


Assessment & Plan


Estimated LOS:  days patient continues medically compromised noted appears she 

is being treated for congestive heart failure.  Patient is coping with this the 

time she gets a little bit irritable and feisty.  For now continue treatment we 

will continue our titration of the medications


Justification for Cont. Inpt.


At this time patient would decompensate a place to the lower level of care


Discharge Planning


To be determined


Request HC Surrog/Guard Advoc?:  No











Carter Casey MD Jun 6, 2018 15:12

## 2018-06-07 VITALS
TEMPERATURE: 98 F | SYSTOLIC BLOOD PRESSURE: 121 MMHG | HEART RATE: 86 BPM | DIASTOLIC BLOOD PRESSURE: 60 MMHG | RESPIRATION RATE: 16 BRPM | OXYGEN SATURATION: 95 %

## 2018-06-07 RX ADMIN — ACETAMINOPHEN PRN MG: 325 TABLET ORAL at 20:10

## 2018-06-07 RX ADMIN — LOSARTAN POTASSIUM SCH MG: 50 TABLET, FILM COATED ORAL at 09:37

## 2018-06-07 RX ADMIN — FUROSEMIDE SCH MG: 20 TABLET ORAL at 09:37

## 2018-06-07 RX ADMIN — BUDESONIDE AND FORMOTEROL FUMARATE DIHYDRATE SCH PUFF: 160; 4.5 AEROSOL RESPIRATORY (INHALATION) at 20:10

## 2018-06-07 RX ADMIN — IPRATROPIUM BROMIDE AND ALBUTEROL SULFATE SCH AMPULE: .5; 3 SOLUTION RESPIRATORY (INHALATION) at 08:00

## 2018-06-07 RX ADMIN — CARVEDILOL SCH MG: 12.5 TABLET, FILM COATED ORAL at 09:37

## 2018-06-07 RX ADMIN — IPRATROPIUM BROMIDE AND ALBUTEROL SULFATE SCH AMPULE: .5; 3 SOLUTION RESPIRATORY (INHALATION) at 14:00

## 2018-06-07 RX ADMIN — CARVEDILOL SCH MG: 12.5 TABLET, FILM COATED ORAL at 20:09

## 2018-06-07 RX ADMIN — BUDESONIDE AND FORMOTEROL FUMARATE DIHYDRATE SCH PUFF: 160; 4.5 AEROSOL RESPIRATORY (INHALATION) at 09:00

## 2018-06-07 RX ADMIN — ASPIRIN 81 MG SCH MG: 81 TABLET ORAL at 09:37

## 2018-06-07 NOTE — HHI.PYPN
Subjective


Remarks


Patient seen in her room with nurse Lance and medical student in the a.m., 

chart reviewed, patient complaint medications.  Patient continues on oxygen by 

mask, continued being treated by the medicine service for her congestive heart 

failure.  Continues somewhat confused and irritable similar to her first 

admitting behaviors but worse.  She now does not recognize me or recognize the 

medical student.  Patient's family have been involved and aware of the 

situation.  They now request a CODE STATUS be changed to DO NOT RESUSCITATE.  

Patient is having difficulty sleeping she was also shows some arousal this 

afternoon patient was given an injection of Ativan this afternoon which calmed 

her down we will offer 1 mg Ativan at at bedtime to help her with sleep.





Review of Systems


Except as stated in HPI:  all other systems reviewed are Neg





Mental Status Examination


Appearance:  Appropriate


Consciousness:  Alert


Orientation:  Person, Place, Date/Time (Somewhat vague)


Motor Activity:  Other (Patient seen in bed)


Speech:  Pressured, Rapid


Language:  Adequate


Fund of Knowledge:  Adequate


Attention and Concentration:  Adequate


Memory:  Impaired


Mood:  Other (Euthymic)


Affect:  Other (Decreased range and intensity)


Thought Process & Associations:  Disorganized (Markedly improved)


Thought Content:  Bizarre thinking


Hallucination Type:  None


Delusion Type:  Paranoid (Mildly)


Suicidal Ideation:  No


Suicidal Plan:  No


Suicidal Intention:  No


Homicidal Ideation:  No


Homicidal Plan:  No


Homicidal Intention:  No


Insight:  Poor


Judgment:  Poor





Results


Vitals/IOs





Vital Signs








  Date Time  Temp Pulse Resp B/P (MAP) Pulse Ox O2 Delivery O2 Flow Rate FiO2


 


6/6/18 19:25     92 Nasal Cannula 5.00 


 


6/6/18 18:00 97.7 74 16 127/60 (82)    














Intake and Output   


 


 6/7/18 6/7/18 6/8/18





 08:00 16:00 00:00


 


Intake Total  720 ml 


 


Output Total  300 ml 


 


Balance  420 ml 











Assessment & Plan


Problem List:  


(1) Brief psychotic disorder


ICD Codes:  F23 - Brief psychotic disorder


Assessment & Plan


Estimated LOS:  days patient is showing some increased signs of psychosis that 

perhaps the may be a component of delirium involved with her right now also.  

We will add small dose of Ativan at at bedtime to help with sleep


Justification for Cont. Inpt.


At this time patient would decompensate a place to a lower level of care


Discharge Planning


To be determined


Request HC Surrog/Guard Advoc?:  Carter Heller MD Jun 7, 2018 15:59

## 2018-06-07 NOTE — RADRPT
EXAM DATE:  6/7/2018 1:29 PM EDT

AGE/SEX:        87 years / Female



INDICATIONS:  Short of breath.



CLINICAL DATA:  This is the patient's subsequent encounter. Patient reports that signs and symptoms h
ave been present for 1 week and indicates a pain score of Nonresponsive. 

                                                                          

MEDICAL/SURGICAL HISTORY:       Hypertension. . Appendectomy. Pacemaker. Kyphoplasty. Bowel surgery.



COMPARISON:      [facility] – [place], CHEST SINGLE AP, 6/6/2018.  . 





FINDINGS:  

A single AP view of the chest demonstrates motion artifact. Bibasilar atelectasis is new. Interstitia
l prominence throughout both lungs is slightly improved. Focal intra-alveolar opacity within the righ
t upper lobe is improved. No discrete effusions. Pacing device overlies left chest. No pneumothorax.





CONCLUSION: 

Stable interstitial prominence suggesting interstitial edema. Improving right upper lobe intra-alveol
ar opacities suggesting improving edema. Bibasilar atelectasis.



Electronically signed by: Wilmer Hamilton MD  6/7/2018 1:34 PM EDT

## 2018-06-08 VITALS
TEMPERATURE: 97.7 F | HEART RATE: 77 BPM | RESPIRATION RATE: 24 BRPM | SYSTOLIC BLOOD PRESSURE: 132 MMHG | DIASTOLIC BLOOD PRESSURE: 59 MMHG | OXYGEN SATURATION: 97 %

## 2018-06-08 VITALS — OXYGEN SATURATION: 96 %

## 2018-06-08 LAB
BASOPHILS # BLD AUTO: 0.1 TH/MM3 (ref 0–0.2)
BASOPHILS NFR BLD: 0.6 % (ref 0–2)
BUN SERPL-MCNC: 27 MG/DL (ref 7–18)
CALCIUM SERPL-MCNC: 8.8 MG/DL (ref 8.5–10.1)
CHLORIDE SERPL-SCNC: 101 MEQ/L (ref 98–107)
CREAT SERPL-MCNC: 1.12 MG/DL (ref 0.5–1)
EOSINOPHIL # BLD: 0.2 TH/MM3 (ref 0–0.4)
EOSINOPHIL NFR BLD: 1.9 % (ref 0–4)
ERYTHROCYTE [DISTWIDTH] IN BLOOD BY AUTOMATED COUNT: 15.4 % (ref 11.6–17.2)
GFR SERPLBLD BASED ON 1.73 SQ M-ARVRAT: 46 ML/MIN (ref 89–?)
GLUCOSE SERPL-MCNC: 85 MG/DL (ref 74–106)
HCO3 BLD-SCNC: 34.6 MEQ/L (ref 21–32)
HCT VFR BLD CALC: 34.2 % (ref 35–46)
HGB BLD-MCNC: 10.8 GM/DL (ref 11.6–15.3)
LYMPHOCYTES # BLD AUTO: 0.7 TH/MM3 (ref 1–4.8)
LYMPHOCYTES NFR BLD AUTO: 6.7 % (ref 9–44)
MAGNESIUM SERPL-MCNC: 1.6 MG/DL (ref 1.5–2.5)
MCH RBC QN AUTO: 27.9 PG (ref 27–34)
MCHC RBC AUTO-ENTMCNC: 31.6 % (ref 32–36)
MCV RBC AUTO: 88 FL (ref 80–100)
MONOCYTE #: 1.2 TH/MM3 (ref 0–0.9)
MONOCYTES NFR BLD: 11.1 % (ref 0–8)
NEUTROPHILS # BLD AUTO: 8.4 TH/MM3 (ref 1.8–7.7)
NEUTROPHILS NFR BLD AUTO: 79.7 % (ref 16–70)
PLATELET # BLD: 390 TH/MM3 (ref 150–450)
PMV BLD AUTO: 8.1 FL (ref 7–11)
RBC # BLD AUTO: 3.89 MIL/MM3 (ref 4–5.3)
SODIUM SERPL-SCNC: 143 MEQ/L (ref 136–145)
WBC # BLD AUTO: 10.5 TH/MM3 (ref 4–11)

## 2018-06-08 RX ADMIN — ACETAMINOPHEN PRN MG: 325 TABLET ORAL at 14:21

## 2018-06-08 RX ADMIN — FUROSEMIDE SCH MG: 20 TABLET ORAL at 08:30

## 2018-06-08 RX ADMIN — ACETAMINOPHEN PRN MG: 325 TABLET ORAL at 08:49

## 2018-06-08 RX ADMIN — CARVEDILOL SCH MG: 12.5 TABLET, FILM COATED ORAL at 08:30

## 2018-06-08 RX ADMIN — IPRATROPIUM BROMIDE AND ALBUTEROL SULFATE SCH AMPULE: .5; 3 SOLUTION RESPIRATORY (INHALATION) at 09:27

## 2018-06-08 RX ADMIN — LOSARTAN POTASSIUM SCH MG: 50 TABLET, FILM COATED ORAL at 08:29

## 2018-06-08 RX ADMIN — ACETAMINOPHEN PRN MG: 325 TABLET ORAL at 02:55

## 2018-06-08 RX ADMIN — ASPIRIN 81 MG SCH MG: 81 TABLET ORAL at 08:29

## 2018-06-08 RX ADMIN — IPRATROPIUM BROMIDE AND ALBUTEROL SULFATE SCH AMPULE: .5; 3 SOLUTION RESPIRATORY (INHALATION) at 08:00

## 2018-06-08 RX ADMIN — BUDESONIDE AND FORMOTEROL FUMARATE DIHYDRATE SCH PUFF: 160; 4.5 AEROSOL RESPIRATORY (INHALATION) at 09:00

## 2018-06-08 NOTE — HHI.DS
Psychiatry Discharge Summary


Inpatient Psychiatric care?:  Yes


Advance Directive:  No


Reason Not Provided:  not available


Mental Health AdvanceDirective:  Yes


Health Care Proxy:  Yes


Admission


Admission Date


Jun 1, 2018 at 18:50


Admission Diagnosis:  


(1) Brief psychotic disorder


ICD Code:  F23 - Brief psychotic disorder


Brief History


Patient was seen June 1, 2018.  This is a late entry





The patient is a 87-year-old  woman, domiciled with her nephew in 

[facility], , mother of 2 kids, supported by Social Security, with 

psychiatric history of anxiety, she is on Xanax 0.5 mg twice daily prescribed 

by PCP, no previous psychiatric hospitalizations, no previous suicide attempts, 

with a medical history of COPD, hypertension, who presents the emergency room 

for evaluation of altered mental status.  As per patient's daughter, patient 

was at home with family members.  Reports that patient she began to become 

paranoid last night. Reports that she is convinced that her nephew's wife whom 

she lives with is trying to kill her. Reports that she called the  last 

night around 3am and had them follow her while she drove to her daughter's home 

in [place].  Reports that patient has never acted like this in the past. 

Patient with no psych history.  She has being admitted under observation due to 

Acute delirium. Head CT was negative for any acute changes. Pt had a 

questionable UTI with a slightly abnormal UA and was given a dose of Rocephin 

in the ED otherwise her labs were stable.  Consulted to psychiatry to address 

psychosis.  On psychiatric evaluation I find the patient is very talkative, at 

times pressure, difficult to redirect.  Patient presents with very labile mood, 

mood swings, emotional incontinence.  She received me crying profusely, stating 

that her family is plotting to put her in the hospital.  The patient states 

that she feels very guilty "because I done something very terrible to my family

, I called the police because I know something wrong was happening to my 

daughter, but now she is upset with me".  She also reports that several member 

of her family and lying to and they want to call the police on her.  The 

patient has to be redirected multiple times during the evaluation due to her 

pressure speech.  The patient is fully oriented 3.  She knows who is the 

 she denies the use of illegal drugs and alcohol.

  Her daughter Janae Fraser, he states that the patient is acting out of character 

for the last week.  He says that the patient has been talking to herself, 

making a lot of accusations with no based in the reality, and acting really 

bizarre.  For example, 2 nights ago she called the police reporting that her 

daughter was in danger made the police to go to her daughter's house, but 

really nothing was happening.  She also has been sleeping poorly, "and talking 

even when she is asleep".


Tobacco Use In Past 30 Days:  No Tobacco Past 30 Days


Alcohol Use:  Never


Hospital Course


Patient's hospital course showed some initial improvement in her psychosis, 

patient then developed significant congestive heart failure.  Patient's family 

requested a hospice consultation.  Hospitalist was consulted this morning.  The 

agree that she is an appropriate candidate for their care center.  They wish 

her to be discharged today to the care center, family agrees with this, thus 

patient to be discharged today to the [facility] care center, Rx 1 month, follow-

up services through that facility





Results


Blood Pressure


132 / 59





Vital Signs








  Date Time  Temp Pulse Resp B/P (MAP) Pulse Ox O2 Delivery O2 Flow Rate FiO2


 


6/8/18 09:15     96 Partial Rebreather 10.00 


 


6/8/18 05:53 97.7 77 24 132/59 (83)    











Laboratory Tests








Test


  6/6/18


14:00 6/6/18


14:25 6/8/18


05:49 6/8/18


09:51


 


Red Blood Count


  3.58 MIL/MM3


(4.00-5.30) 


  


  3.89 MIL/MM3


(4.00-5.30)


 


Hemoglobin


  10.1 GM/DL


(11.6-15.3) 


  


  10.8 GM/DL


(11.6-15.3)


 


Hematocrit


  31.4 %


(35.0-46.0) 


  


  34.2 %


(35.0-46.0)


 


Neutrophils (%) (Auto)


  77.7 %


(16.0-70.0) 


  


  79.7 %


(16.0-70.0)


 


Lymphocytes (%) (Auto)


  8.2 %


(9.0-44.0) 


  


  6.7 %


(9.0-44.0)


 


Monocytes (%) (Auto)


  12.1 %


(0.0-8.0) 


  


  11.1 %


(0.0-8.0)


 


Neutrophils # (Auto)


  7.8 TH/MM3


(1.8-7.7) 


  


  8.4 TH/MM3


(1.8-7.7)


 


Lymphocytes # (Auto)


  0.8 TH/MM3


(1.0-4.8) 


  


  0.7 TH/MM3


(1.0-4.8)


 


Monocytes # (Auto)


  1.2 TH/MM3


(0-0.9) 


  


  1.2 TH/MM3


(0-0.9)


 


Blood Urea Nitrogen


  20 MG/DL


(7-18) 


  27 MG/DL


(7-18) 


 


 


Carbon Dioxide Level


  32.1 MEQ/L


(21.0-32.0) 


  34.6 MEQ/L


(21.0-32.0) 


 


 


Estimat Glomerular Filtration


Rate 66 ML/MIN


(>89) 


  46 ML/MIN


(>89) 


 


 


B-Type Natriuretic Peptide


  675 PG/ML


(0-100) 


  


  


 


 


Blood Gas HCO3


  


  33 mmol/L


(22-26) 


  


 


 


Blood Gas Base Excess


  


  7.6 mmol/L


(-2-2) 


  


 


 


Blood Gas Oxygen Saturation  88 % ()   


 


Arterial Blood pH


  


  7.37


(7.380-7.420) 


  


 


 


Arterial Blood Partial


Pressure CO2 


  59 mmHg


(38-42) 


  


 


 


Arterial Blood Partial


Pressure O2 


  59 mmHg


() 


  


 


 


Creatinine


  


  


  1.12 MG/DL


(0.50-1.00) 


 


 


Mean Corpuscular Hemoglobin


Concent 


  


  


  31.6 %


(32.0-36.0)








 Laboratory Results








Test


  6/2/18


06:20


 


Cholesterol Level


  60 MG/DL


(120-200)


 


HDL Cholesterol


  22.5 MG/DL


(40.0-60.0)


 


Hemoglobin A1c


  6.4 %


(4.3-6.0)


 


LDL Cholesterol


  17 MG/DL


(0-99)


 


Triglycerides Level


  101 MG/DL


()








Summary of Procedures


None done


Imaging





Last Impressions








Chest X-Ray 6/7/18 0000 Signed





Impressions: 





 CONCLUSION: 





 Stable interstitial prominence suggesting interstitial edema. Improving right u





 pper lobe intra-alveolar opacities suggesting improving edema. Bibasilar atelec





 tasis.





  





 








Pending results at discharge:  No





Medications


# of Antipsychotic meds at D/C:  1


Approp Antipsych med options


1 - Minimum of three failed multiple trials of monotherapy.


2 - Documented plan to taper to monotherapy due to previous use of multiple 

meds OR cross-taper in progress at D/C.


3 - Documentation of augmentation of Clozapine.


4 - Justification other than those listed in allowable values 1-3, document here

:





Discharge


Discharge Date:  Jun 8, 2018


Discharge Diagnosis:  


(1) Brief psychotic disorder


Diagnosis:  Principal


ICD Code:  F23 - Brief psychotic disorder


Pt Condition on Discharge:  Guarded


Discharge Disposition:  Hospice/Med Facility





Discharge Instructions


Diet Instructions:  As Tolerated, No Restrictions


Activities you can perform:  Regular-No Restrictions


Scheduled Appointment:  [facility]





Discharge Time


> 30 minutes





Mental Status Examination


Appearance:  Appropriate


Consciousness:  Alert


Orientation:  Person, Place, Date/Time (Somewhat vague)


Motor Activity:  Other (Patient seen in bed)


Speech:  Pressured, Rapid


Language:  Adequate


Fund of Knowledge:  Adequate


Attention and Concentration:  Adequate


Memory:  Impaired


Mood:  Other (Euthymic)


Affect:  Other (Decreased range and intensity)


Thought Process & Associations:  Disorganized (Markedly improved)


Thought Content:  Bizarre thinking


Hallucination Type:  None


Delusion Type:  Paranoid (Mildly)


Suicidal Ideation:  No


Suicidal Plan:  No


Suicidal Intention:  No


Homicidal Ideation:  No


Homicidal Plan:  No


Homicidal Intention:  No


Insight:  Poor


Judgment:  Poor





Discharge/Advance Care Plan


Health Problems:  


(1) Brief psychotic disorder


Goals to promote your health


* To prevent worsening of your condition and complications


* To maintain your health at the optimal level


Directions to meet your goals


*** Take your medications as prescribed


***  Follow your dietary instruction


***  Follow activity as directed





***  Keep your appointments as scheduled


***  Take your immunizations and boosters as scheduled


***  If your symptoms worsen call your PCP, if no PCP go to Urgent Care Center 

or Emergency Room ***


***  For 24/7 questions related to your inpatient stay or results of tests 

pending at discharge, please contact Dr. Carter Casey at [phone]


***  Smoking is Dangerous to Your Health. Avoid second hand smoking ***











Carter Casey MD Jun 8, 2018 13:40

## 2018-06-08 NOTE — [facility].PYPN
Subjective


Remarks


Patient seen in her room with nurse Alisha medical student Casi, chart 

review, medicine progress notes reviewed, outpatient continues diffusely 

confused disoriented she is less angry and irritable today though at times 

continues to show her feistiness.  She continues disoriented to place time and 

situation.  Family has requested that we have a hospitalist assessment done.  I 

will order that.  For now continue treatment no change psychiatrically





Review of Systems


Except as stated in HPI:  all other systems reviewed are Neg





Mental Status Examination


Appearance:  Appropriate


Consciousness:  Alert


Orientation:  Person, Place, Date/Time (Somewhat vague)


Motor Activity:  Other (Patient seen in bed)


Speech:  Pressured, Rapid


Language:  Adequate


Fund of Knowledge:  Adequate


Attention and Concentration:  Adequate


Memory:  Impaired


Mood:  Other (Euthymic)


Affect:  Other (Decreased range and intensity)


Thought Process & Associations:  Disorganized (Markedly improved)


Thought Content:  Bizarre thinking


Hallucination Type:  None


Delusion Type:  Paranoid (Mildly)


Suicidal Ideation:  No


Suicidal Plan:  No


Suicidal Intention:  No


Homicidal Ideation:  No


Homicidal Plan:  No


Homicidal Intention:  No


Insight:  Poor


Judgment:  Poor





Results


Labs











Test


  6/8/18


05:49


 


Blood Urea Nitrogen 27 MG/DL 


 


Creatinine 1.12 MG/DL 


 


Random Glucose 85 MG/DL 


 


Calcium Level 8.8 MG/DL 


 


Magnesium Level 1.6 MG/DL 


 


Sodium Level 143 MEQ/L 


 


Potassium Level 4.0 MEQ/L 


 


Chloride Level 101 MEQ/L 


 


Carbon Dioxide Level 34.6 MEQ/L 


 


Anion Gap 7 MEQ/L 


 


Estimat Glomerular Filtration


Rate 46 ML/MIN 


 








Vitals/IOs





Vital Signs








  Date Time  Temp Pulse Resp B/P (MAP) Pulse Ox O2 Delivery O2 Flow Rate FiO2


 


6/8/18 05:53 97.7 77 24 132/59 (83) 97   


 


6/6/18 19:25      Nasal Cannula 5.00 











Assessment & Plan


Problem List:  


(1) Brief psychotic disorder


ICD Codes:  F23 - Brief psychotic disorder


Assessment & Plan


Estimated LOS:  days patient continues confused somewhat psychotic but more 

disoriented.  We will get a hospitalist consult


Justification for Cont. Inpt.


At this time patient would decompensate a place to the lower level of care


Discharge Planning


To be determined


Request HC Surrog/Guard Advoc?:  No











Carter Casey MD Jun 8, 2018 09:08